# Patient Record
Sex: MALE
[De-identification: names, ages, dates, MRNs, and addresses within clinical notes are randomized per-mention and may not be internally consistent; named-entity substitution may affect disease eponyms.]

---

## 2020-06-26 ENCOUNTER — HOSPITAL ENCOUNTER (INPATIENT)
Dept: HOSPITAL 88 - ER | Age: 80
LOS: 2 days | Discharge: HOME | DRG: 378 | End: 2020-06-28
Attending: INTERNAL MEDICINE | Admitting: INTERNAL MEDICINE
Payer: MEDICARE

## 2020-06-26 VITALS — DIASTOLIC BLOOD PRESSURE: 63 MMHG | SYSTOLIC BLOOD PRESSURE: 116 MMHG

## 2020-06-26 VITALS — SYSTOLIC BLOOD PRESSURE: 108 MMHG | DIASTOLIC BLOOD PRESSURE: 58 MMHG

## 2020-06-26 VITALS — WEIGHT: 112 LBS | HEIGHT: 68 IN | BODY MASS INDEX: 16.97 KG/M2

## 2020-06-26 VITALS — DIASTOLIC BLOOD PRESSURE: 69 MMHG | SYSTOLIC BLOOD PRESSURE: 140 MMHG

## 2020-06-26 VITALS — SYSTOLIC BLOOD PRESSURE: 112 MMHG | DIASTOLIC BLOOD PRESSURE: 56 MMHG

## 2020-06-26 VITALS — DIASTOLIC BLOOD PRESSURE: 61 MMHG | SYSTOLIC BLOOD PRESSURE: 128 MMHG

## 2020-06-26 VITALS — SYSTOLIC BLOOD PRESSURE: 125 MMHG | DIASTOLIC BLOOD PRESSURE: 56 MMHG

## 2020-06-26 DIAGNOSIS — K92.2: Primary | ICD-10-CM

## 2020-06-26 DIAGNOSIS — R13.10: ICD-10-CM

## 2020-06-26 DIAGNOSIS — R63.4: ICD-10-CM

## 2020-06-26 DIAGNOSIS — D50.0: ICD-10-CM

## 2020-06-26 DIAGNOSIS — K20.9: ICD-10-CM

## 2020-06-26 DIAGNOSIS — K31.84: ICD-10-CM

## 2020-06-26 DIAGNOSIS — E11.43: ICD-10-CM

## 2020-06-26 DIAGNOSIS — N13.30: ICD-10-CM

## 2020-06-26 LAB
ALBUMIN SERPL-MCNC: 3.1 G/DL (ref 3.5–5)
ALBUMIN/GLOB SERPL: 1.1 {RATIO} (ref 0.8–2)
ALP SERPL-CCNC: 79 IU/L (ref 40–150)
ALT SERPL-CCNC: 19 IU/L (ref 0–55)
AMYLASE SERPL-CCNC: 39 U/L (ref 25–125)
ANION GAP SERPL CALC-SCNC: 11 MMOL/L (ref 8–16)
BASOPHILS # BLD AUTO: 0 10*3/UL (ref 0–0.1)
BASOPHILS # BLD AUTO: 0.1 10*3/UL (ref 0–0.1)
BASOPHILS NFR BLD AUTO: 0.5 % (ref 0–1)
BASOPHILS NFR BLD AUTO: 0.7 % (ref 0–1)
BUN SERPL-MCNC: 26 MG/DL (ref 7–26)
BUN/CREAT SERPL: 27 (ref 6–25)
CALCIUM SERPL-MCNC: 8.1 MG/DL (ref 8.4–10.2)
CHLORIDE SERPL-SCNC: 103 MMOL/L (ref 98–107)
CK MB SERPL-MCNC: 3.1 NG/ML (ref 0–5)
CK SERPL-CCNC: 130 IU/L (ref 30–200)
CO2 SERPL-SCNC: 24 MMOL/L (ref 22–29)
DEPRECATED APTT PLAS QN: 31.9 SECONDS (ref 23.8–35.5)
DEPRECATED INR PLAS: 0.99
DEPRECATED NEUTROPHILS # BLD AUTO: 3.4 10*3/UL (ref 2.1–6.9)
DEPRECATED NEUTROPHILS # BLD AUTO: 5 10*3/UL (ref 2.1–6.9)
EGFRCR SERPLBLD CKD-EPI 2021: > 60 ML/MIN (ref 60–?)
EOSINOPHIL # BLD AUTO: 0.1 10*3/UL (ref 0–0.4)
EOSINOPHIL # BLD AUTO: 0.2 10*3/UL (ref 0–0.4)
EOSINOPHIL NFR BLD AUTO: 1.4 % (ref 0–6)
EOSINOPHIL NFR BLD AUTO: 3.1 % (ref 0–6)
ERYTHROCYTE [DISTWIDTH] IN CORD BLOOD: 19.3 % (ref 11.7–14.4)
ERYTHROCYTE [DISTWIDTH] IN CORD BLOOD: 19.4 % (ref 11.7–14.4)
GLOBULIN PLAS-MCNC: 2.7 G/DL (ref 2.3–3.5)
GLUCOSE SERPLBLD-MCNC: 116 MG/DL (ref 74–118)
HCT VFR BLD AUTO: 20.2 % (ref 38.2–49.6)
HCT VFR BLD AUTO: 21.1 % (ref 38.2–49.6)
HGB BLD-MCNC: 6 G/DL (ref 14–18)
HGB BLD-MCNC: 6.2 G/DL (ref 14–18)
LIPASE SERPL-CCNC: 57 U/L (ref 8–78)
LYMPHOCYTES # BLD: 1.1 10*3/UL (ref 1–3.2)
LYMPHOCYTES # BLD: 1.3 10*3/UL (ref 1–3.2)
LYMPHOCYTES NFR BLD AUTO: 15.9 % (ref 18–39.1)
LYMPHOCYTES NFR BLD AUTO: 21.2 % (ref 18–39.1)
MCH RBC QN AUTO: 27.8 PG (ref 28–32)
MCH RBC QN AUTO: 27.9 PG (ref 28–32)
MCHC RBC AUTO-ENTMCNC: 29.4 G/DL (ref 31–35)
MCHC RBC AUTO-ENTMCNC: 29.7 G/DL (ref 31–35)
MCV RBC AUTO: 94 FL (ref 81–99)
MCV RBC AUTO: 94.6 FL (ref 81–99)
MONOCYTES # BLD AUTO: 0.8 10*3/UL (ref 0.2–0.8)
MONOCYTES # BLD AUTO: 1 10*3/UL (ref 0.2–0.8)
MONOCYTES NFR BLD AUTO: 11.2 % (ref 4.4–11.3)
MONOCYTES NFR BLD AUTO: 16.8 % (ref 4.4–11.3)
NEUTS SEG NFR BLD AUTO: 58.1 % (ref 38.7–80)
NEUTS SEG NFR BLD AUTO: 70.4 % (ref 38.7–80)
PLATELET # BLD AUTO: 263 X10E3/UL (ref 140–360)
PLATELET # BLD AUTO: 333 X10E3/UL (ref 140–360)
POTASSIUM SERPL-SCNC: 4 MMOL/L (ref 3.5–5.1)
PROTHROMBIN TIME: 13.7 SECONDS (ref 11.9–14.5)
RBC # BLD AUTO: 2.15 X10E6/UL (ref 4.3–5.7)
RBC # BLD AUTO: 2.23 X10E6/UL (ref 4.3–5.7)
SODIUM SERPL-SCNC: 134 MMOL/L (ref 136–145)

## 2020-06-26 PROCEDURE — 85610 PROTHROMBIN TIME: CPT

## 2020-06-26 PROCEDURE — 82270 OCCULT BLOOD FECES: CPT

## 2020-06-26 PROCEDURE — 86850 RBC ANTIBODY SCREEN: CPT

## 2020-06-26 PROCEDURE — 84466 ASSAY OF TRANSFERRIN: CPT

## 2020-06-26 PROCEDURE — 82728 ASSAY OF FERRITIN: CPT

## 2020-06-26 PROCEDURE — 99284 EMERGENCY DEPT VISIT MOD MDM: CPT

## 2020-06-26 PROCEDURE — 88312 SPECIAL STAINS GROUP 1: CPT

## 2020-06-26 PROCEDURE — 71045 X-RAY EXAM CHEST 1 VIEW: CPT

## 2020-06-26 PROCEDURE — 82948 REAGENT STRIP/BLOOD GLUCOSE: CPT

## 2020-06-26 PROCEDURE — 30233N1 TRANSFUSION OF NONAUTOLOGOUS RED BLOOD CELLS INTO PERIPHERAL VEIN, PERCUTANEOUS APPROACH: ICD-10-PCS | Performed by: EMERGENCY MEDICINE

## 2020-06-26 PROCEDURE — 80048 BASIC METABOLIC PNL TOTAL CA: CPT

## 2020-06-26 PROCEDURE — 86900 BLOOD TYPING SEROLOGIC ABO: CPT

## 2020-06-26 PROCEDURE — 83690 ASSAY OF LIPASE: CPT

## 2020-06-26 PROCEDURE — 82553 CREATINE MB FRACTION: CPT

## 2020-06-26 PROCEDURE — 84484 ASSAY OF TROPONIN QUANT: CPT

## 2020-06-26 PROCEDURE — 88342 IMHCHEM/IMCYTCHM 1ST ANTB: CPT

## 2020-06-26 PROCEDURE — 85025 COMPLETE CBC W/AUTO DIFF WBC: CPT

## 2020-06-26 PROCEDURE — 74177 CT ABD & PELVIS W/CONTRAST: CPT

## 2020-06-26 PROCEDURE — 93005 ELECTROCARDIOGRAM TRACING: CPT

## 2020-06-26 PROCEDURE — 82550 ASSAY OF CK (CPK): CPT

## 2020-06-26 PROCEDURE — 85045 AUTOMATED RETICULOCYTE COUNT: CPT

## 2020-06-26 PROCEDURE — 85730 THROMBOPLASTIN TIME PARTIAL: CPT

## 2020-06-26 PROCEDURE — 82746 ASSAY OF FOLIC ACID SERUM: CPT

## 2020-06-26 PROCEDURE — 82150 ASSAY OF AMYLASE: CPT

## 2020-06-26 PROCEDURE — 86920 COMPATIBILITY TEST SPIN: CPT

## 2020-06-26 PROCEDURE — 43450 DILATE ESOPHAGUS 1/MULT PASS: CPT

## 2020-06-26 PROCEDURE — 88305 TISSUE EXAM BY PATHOLOGIST: CPT

## 2020-06-26 PROCEDURE — 83540 ASSAY OF IRON: CPT

## 2020-06-26 PROCEDURE — 82607 VITAMIN B-12: CPT

## 2020-06-26 PROCEDURE — 43239 EGD BIOPSY SINGLE/MULTIPLE: CPT

## 2020-06-26 PROCEDURE — 36415 COLL VENOUS BLD VENIPUNCTURE: CPT

## 2020-06-26 PROCEDURE — 80053 COMPREHEN METABOLIC PANEL: CPT

## 2020-06-26 RX ADMIN — SODIUM CHLORIDE SCH MG: 900 INJECTION INTRAVENOUS at 20:52

## 2020-06-26 RX ADMIN — DEXTROSE AND SODIUM CHLORIDE SCH MLS/HR: 5; 900 INJECTION, SOLUTION INTRAVENOUS at 16:00

## 2020-06-26 RX ADMIN — Medication SCH MLS/HR: at 16:00

## 2020-06-26 NOTE — NUR
Patient seen and evaluated



History of present illness:



80-year-old gentleman who presented to the emergency department as advised by 
his primary care physician for evaluation of left open weight and feeling like 
his food gets stuck in his esophagus.  The patient has not been eating well 
and was noted to have severe anemia hemoglobin was 6.3.  In addition to this 
complaining of intermittent black stools.  Denies hematemesis.  Has been 
complaining of abdominal pain, not significant.  There is no history of chest 
pain, no shortness of breath, no fever no chills.  I went ahead and discussed 
patient condition with Dr. Capo Ryan and the plan is for the patient to 
undergo EGD.

The patient did have CT scan of the abdomen done and the findings were that of 
diffuse distal stomach wall thickening measuring up to 2.5 cm.  No focal mass. 
 Findings could be with an infectious/inflammatory etiology such as gastritis 
or alternatively in the setting of a lymphoproliferative process.



Other findings were that of marketed prostatomegaly.  Distended bladder with 
irregular appearance of the bladder wall without focal mass lesions.  Findings 
were suggestive of chronic bladder outlet obstruction. Mild associated 
bilateral hydronephrosis without extubation [id number] obstructing calculus.



We went ahead and proceeded to order a transfusion of packed red blood cells.



Past medical history:

There is a history of diabetes mellitus type 2, no history myocardial 
infarction.  No CVA.  No previous gastrointestinal problems.



Family history: Noncontributory.



Social history: No tobacco.  No alcohol abuse.



Allergies: No known drug allergies.



Review of system:



Constitutional: No Fever, No chills, No General weakness

HEENT: No headaches.

Cardiovascular: Denies chest pain, palpitations, PND, swelling of the legs.

Respiratory: No Cough, hemoptysis or SOB

GI: Denies Nausea/V/D, hematemesis, melena. 

: Denies Hematuria, Dysuria, Frequency

Musculoskeletal: Denies joint pain

Neuro:  No focal weakness

Psych: No anxiety or depression. 

Skin: No rashes, Itching, Hives



Physical exam: General patient alert oriented person time place.  Severely 
malnourished patient.  Underweight.

Vital signs: Blood pressure 117/59, respiration 16, pulse 86, temperature 98.8





HEENT: No gross abnormalities

Neck: Supple no JVD

Lungs: Clear to auscultation

Heart: Regular rate and rhythm, no murmurs no gallops

Abdomen: Soft non tender, no guarding.

Extremities: No edema

Neurologic: Alert oriented 3, no focal weakness.

Psychiatrist: Normal mood, normal judgment.

Skin: No rashes



Lab data: Hemoglobin 6.2, WBC 7.12, platelet count 333,000.  Sodium 134, 
potassium 4.0, chloride 103, CO2 24, BUN 26, creatinine 0.95



Chest x-rays:

No focal pneumonia or pulmonary edema.





Medications in the ED



Pantoprazole Sodium 80 mg ONCE  STAT IV  Last administered on 6/26/20at 14:45; 
Admin Dose 80 MG;  Start 6/26/20 at 13:59;  Stop 6/26/20 at 14:23;  Status DC

Sodium Chloride 1,000 ml @  0 mls/hr Q0M STAT IV  Last administered on 
6/26/20at 14:45; Admin Dose 999 MLS/HR;  Start 6/26/20 at 13:59;  Stop 6/26/20 
at 14:03;  Status DC

Sodium Chloride 250 ml @ 0 mls/hr ONCE  ONCE IV ;  Start 6/26/20 at 14:00;  
Stop 6/26/20 at 14:03;  Status DC

Furosemide 20 mg ONCE  PRN IV SHORTNESS OF BREATH;  Start 6/26/20 at 14:00;  
Stop 7/26/20 at 13:59

Diatrizoate Meglum/ Diatrizoate Sod 30 ml STK-MED ONCE PO ;  Start 6/26/20 at 
14:22;  Stop 6/26/20 at 14:16;  Status DC

Pantoprazole Sodium 40 mg Q12HR IV ;  Start 6/26/20 at 21:00;  Stop 7/26/20 at 
20:59;  Status UNV

Octreotide Acetate 0.05 mg ONCE  ONCE IV ;  Start 6/26/20 at 15:00;  Stop 
6/26/20 at 15:02;  Status DC

Octreotide Acetate 500 mcg/ Sodium Chloride 2 ml @ 2 mls/hr Q10H IV ;  Start 
6/26/20 at 15:00;  Stop 7/26/20 at 14:59;  Status UNV

Dextrose/Sodium Chloride 1,000 ml @  100 mls/hr Q10H IV ;  Start 6/26/20 at 
15:00;  Stop 6/27/20 at 10:59;  Status UNV



Assessment:

Severe anemia

GI bleeding

Weight loss

Diabetes mellitus type 2



Plan of care:

Request GI consultation

Transfusion of Packed Red blood cells.

Monitor I's and O's.

Glycemic control

## 2020-06-26 NOTE — EMERGENCY DEPARTMENT NOTE
History of Present Illnes


History of Present Illness


Chief Complaint:  Abdominal Complaints


History of Present Illness


This is a 80 year old  male SENT BY DR. SUSHMA HERRING FOR ADMISSION TO 

DR. CAM. CLIENT STATES THAT HE HAS BEEN HAVING WEIGHT LOSS FOR YEARS AND THE 

FEELING OF HAVING FOOD STUCK IN HIS ESOPHAGUS, STATES THAT HE HAS BEEN HAVING 

THE PAIN AND NOT BEING ABLE TO EAT ANY FULL MEAL. HGB WAS AT 6.3 RECENTLY. INTE

RMITTENT BLACK STOOLS


Historian:  Patient


Arrival Mode:  Car


 Required:  No


Onset (how long ago):  year(s)


Radiation:  Reports non-radiation


Severity:  moderate


Onset quality:  gradual


Timing of current episode:  constant


Chronicity:  new


Context:  Denies recent illness


Relieving factors:  none


Exacerbating factors:  none


Associated symptoms:  Reports denies other symptoms


Treatments prior to arrival:  none





Past Medical/Family History


Physician Review


I have reviewed the patient's past medical and family history.  Any updates have

been documented here.





Past Medical History


Recent Fever:  No


Clinical Suspicion of Infectio:  No


New/Unexplained Change in Ment:  No


Past Medical History:  Diabetes





Social History


Smoking Cessation:  Former smoker


Counseling Performed:  No


Alcohol Use:  None


Any Illegal Drug Use:  No


TB Exposure/Symptoms:  No


Physically hurt or threatened:  No





Other


Any Pre-Existing Lines (PICC,:  No


Is patient up to date on immun:  Yes


Last Flu:  utd


Last Pneumovax:  utd





Review of Systems


Review of Systems


Constitutional:  Reports as per HPI, Reports other (WEIGHT LOSS)


EENTM:  Reports no symptoms


Cardiovascular:  Reports no symptoms


Respiratory:  Reports no symptoms


Gastrointestinal:  Reports as per HPI, Reports abdominal pain (INTERMITTENT MARJORIE 

ABD PAIN), Reports other (BLACK STOOLS)


Genitourinary:  Reports no symptoms


Musculoskeletal:  Reports no symptoms


Integumentary:  Reports no symptoms


Neurological:  Reports no symptoms


Psychological:  Reports no symptoms


Endocrine:  Reports no symptoms


Hematological/Lymphatic:  Reports no symptoms





Physical Exam


Related Data


Allergies:  


Coded Allergies:  


     No Known Allergies (Unverified , 6/26/20)


Triage Vital Signs





Vital Signs








  Date Time  Temp Pulse Resp B/P (MAP) Pulse Ox O2 Delivery O2 Flow Rate FiO2


 


6/26/20 13:42 98.8 86 16 117/59 100   








Vital signs reviewed:  Yes





Physical Exam


CONSTITUTIONAL





Constitutional:  Present cachectic, Present ill appearing


HENT


HENT:  Present normocephalic, Present atraumatic, Present oropharynx 

clear/moist, Present nose normal


HENT L/R:  Present left ext ear normal, Present right ext ear normal


EYES





Eyes:  Reports PERRL, Reports conjunctivae normal


NECK


Neck:  Present ROM normal


PULMONARY


Pulmonary:  Present effort normal, Present breath sounds normal


CARDIOVASCULAR





Cardiovascular:  Present regular rhythm, Present heart sounds normal, Present 

capillary refill normal, Present normal rate


GASTROINTESTINAL





Abdominal:  Present soft, Present nontender, Present bowel sounds normal


GENITOURINARY





Genitourinary:  Present exam deferred


SKIN


Skin:  Present warm, Present dry


MUSCULOSKELETAL





Musculoskeletal:  Present ROM normal


NEUROLOGICAL





Neurological:  Present alert, Present oriented x 3, Present no gross motor or 

sensory deficits


PSYCHOLOGICAL


Psychological:  Present mood/affect normal, Present judgement normal





Results


Laboratory


Result Diagram:  


6/26/20 1410                                                                    

           6/26/20 1410





Laboratory





Laboratory Tests








Test


 6/26/20


14:10


 


White Blood Count


 7.12 x10e3/uL


(4.8-10.8)


 


Red Blood Count


 2.23 x10e6/uL


(4.3-5.7)


 


Hemoglobin


 6.2 g/dL


(14.0-18.0)


 


Hematocrit


 21.1 %


(38.2-49.6)


 


Mean Corpuscular Volume


 94.6 fL


(81-99)


 


Mean Corpuscular Hemoglobin


 27.8 pg


(28-32)


 


Mean Corpuscular Hemoglobin


Concent 29.4 g/dL


(31-35)


 


Red Cell Distribution Width


 19.4 %


(11.7-14.4)


 


Platelet Count


 333 x10e3/uL


(140-360)


 


Neutrophils (%) (Auto)


 70.4 %


(38.7-80.0)


 


Lymphocytes (%) (Auto)


 15.9 %


(18.0-39.1)


 


Monocytes (%) (Auto)


 11.2  %


(4.4-11.3)


 


Eosinophils (%) (Auto)


 1.4 %


(0.0-6.0)


 


Basophils (%) (Auto)


 0.7 %


(0.0-1.0)


 


Neutrophils # (Auto) 5.0 (2.1-6.9) 


 


Lymphocytes # (Auto) 1.1 (1.0-3.2) 


 


Monocytes # (Auto) 0.8 (0.2-0.8) 


 


Eosinophils # (Auto) 0.1 (0.0-0.4) 


 


Basophils # (Auto) 0.1 (0.0-0.1) 


 


Absolute Immature Granulocyte


(auto 0.03 x10e3/uL


(0-0.1)


 


Prothrombin Time


 13.7 seconds


(11.9-14.5)


 


Prothromb Time International


Ratio 0.99 





 


Activated Partial


Thromboplast Time 31.9 seconds


(23.8-35.5)


 


Sodium Level


 134 mmol/L


(136-145)


 


Potassium Level


 4.0 mmol/L


(3.5-5.1)


 


Chloride Level


 103 mmol/L


()


 


Carbon Dioxide Level


 24 mmol/L


(22-29)


 


Anion Gap


 11.0 mmol/L


(8-16)


 


Blood Urea Nitrogen


 26 mg/dL


(7-26)


 


Creatinine


 0.95 mg/dL


(0.72-1.25)


 


Estimat Glomerular Filtration


Rate > 60 ML/MIN


(60-)


 


BUN/Creatinine Ratio 27 (6-25) 


 


Glucose Level


 116 mg/dL


()


 


Calcium Level


 8.1 mg/dL


(8.4-10.2)


 


Total Bilirubin


 0.3 mg/dL


(0.2-1.2)


 


Aspartate Amino Transf


(AST/SGOT) 24 IU/L (5-34) 





 


Alanine Aminotransferase


(ALT/SGPT) 19 IU/L (0-55) 





 


Alkaline Phosphatase


 79 IU/L


()


 


Creatine Kinase


 130 IU/L


()


 


Creatine Kinase MB


 3.10 ng/mL


(0-5.0)


 


Troponin I


 0.007 ng/mL


(0-0.300)


 


Total Protein


 5.8 g/dL


(6.5-8.1)


 


Albumin


 3.1 g/dL


(3.5-5.0)


 


Globulin


 2.7 g/dL


(2.3-3.5)


 


Albumin/Globulin Ratio 1.1 (0.8-2.0) 


 


Amylase Level


 39 U/L


()


 


Lipase 57 U/L (8-78) 








Lab results reviewed:  Yes





Imaging


Imaging results reviewed:  Yes


Impressions


EXAMINATION:  CHEST SINGLE (PORTABLE)    





INDICATION: Weakness





COMPARISON: None


     


FINDINGS:





LINES/TUBES:EKG leads overlie the chest.





LUNGS:The lungs are well-inflated. No focal consolidation or pulmonary edema.





PLEURA:No pleural effusion or pneumothorax.





MEDIASTINUM:The cardiomediastinal silhouette appears normal in size and shape.


Atherosclerotic calcifications of the thoracic aorta.





BONES/SOFT TISSUES:No acute osseous injury.





ABDOMEN:No free air under the diaphragm.








IMPRESSION: 


No focal pneumonia or pulmonary edema.





Signed by: Francisco Gonzalez MD on 6/26/2020 2:34 PM





Assessment & Plan


Medical Decision Making


MDM


SENT FOR ANEMIA & WT LOSS - CHECK CBC, CHEM'S, PT/PTT, VASYL/LIPASE, CT ABD/PELVIS

- EVAL FOR GI BLOOD LOSS, SEVERE ANEMIA REQUIRING BLOOD TRANSFUSION, 

PANCREATITIS, RENAL INSUFF





Reassessment


Reassessment


D/W DR AKERS AND DR KETURAH LAWSON





Assessment & Plan


Final Impression:  


(1) Weight loss


(2) GI bleed


Depart Disposition:  ADMITTED


Last Vital Signs











  Date Time  Temp Pulse Resp B/P (MAP) Pulse Ox O2 Delivery O2 Flow Rate FiO2


 


6/26/20 14:24 98.1 71 13 121/59 100   








Medications in the ED





Pantoprazole Sodium 80 mg ONCE  STAT IV  Last administered on 6/26/20at 14:45; 

Admin Dose 80 MG;  Start 6/26/20 at 13:59;  Stop 6/26/20 at 14:23;  Status DC


Sodium Chloride 1,000 ml @  0 mls/hr Q0M STAT IV  Last administered on 6/26/20at

14:45; Admin Dose 999 MLS/HR;  Start 6/26/20 at 13:59;  Stop 6/26/20 at 14:03;  

Status DC


Sodium Chloride 250 ml @ 0 mls/hr ONCE  ONCE IV ;  Start 6/26/20 at 14:00;  Stop

6/26/20 at 14:03;  Status DC


Furosemide 20 mg ONCE  PRN IV SHORTNESS OF BREATH;  Start 6/26/20 at 14:00;  

Stop 7/26/20 at 13:59


Diatrizoate Meglum/ Diatrizoate Sod 30 ml STK-MED ONCE PO ;  Start 6/26/20 at 

14:22;  Stop 6/26/20 at 14:16;  Status DC


Pantoprazole Sodium 40 mg Q12HR IV ;  Start 6/26/20 at 21:00;  Stop 7/26/20 at 

20:59;  Status UNV


Octreotide Acetate 0.05 mg ONCE  ONCE IV ;  Start 6/26/20 at 15:00;  Stop 

6/26/20 at 15:02;  Status DC


Octreotide Acetate 500 mcg/ Sodium Chloride 2 ml @ 2 mls/hr Q10H IV ;  Start 

6/26/20 at 15:00;  Stop 7/26/20 at 14:59;  Status UNV


Dextrose/Sodium Chloride 1,000 ml @  100 mls/hr Q10H IV ;  Start 6/26/20 at 

15:00;  Stop 6/27/20 at 10:59;  Status UNV











YASIR POPE MD               Jun 26, 2020 15:26

## 2020-06-26 NOTE — DIAGNOSTIC IMAGING REPORT
EXAM: CT Abdomen and Pelvis WITH intravenous contrast  



INDICATION: Abdominal pain



COMPARISON: Chest radiograph of earlier the same day



TECHNIQUE: Abdomen and pelvis were scanned utilizing a multidetector helical

scanner from the lung base to the pubic symphysis after administration of IV

contrast. Coronal and sagittal reformations were obtained. Routine protocol was

performed. Scan was performed during portal venous phase.

     

IV CONTRAST: 100mL of Isovue 370

ORAL CONTRAST: Gastrografin



RADIATION DOSE:

Total DLP:  172 mGy*cm



Dose modulation, iterative reconstruction, and/or weight based adjustment of

the mA/kV was utilized to reduce the radiation dose to as low as reasonably

achievable. 



FINDINGS:

LOWER THORAX: Partially visualized mild bronchial wall thickening and distal

bronchial mucus plugging in the right middle lobe and lingula, possibly related

to aspiration.



HEPATOBILIARY: No focal liver lesion. No biliary ductal dilation. Mild hepatic

steatosis. Unremarkable gallbladder.



SPLEEN: No splenomegaly.



PANCREAS: No focal masses or ductal dilatation.



ADRENALS: No adrenal nodules.

KIDNEYS/URETERS: Mild bilateral hydronephrosis. No definite obstructing

calculus. No solid renal mass lesion. 2.7 cm left upper pole renal cyst.

PELVIC ORGANS/BLADDER: Bladder distention and irregular appearance of the

bladder wall without focal mass lesion. The prostate is enlarged, measuring up

to 5.9 x 5.1 x 5.4 cm (volume estimate 85 cc).



PERITONEUM / RETROPERITONEUM: No free air or fluid.

LYMPH NODES: No lymphadenopathy.

VESSELS: Scattered atherosclerotic calcifications of the nonaneurysmal

abdominal aorta and major branches.



GI TRACT: Distended stomach filled with contrast. Diffuse wall thickening and

edematous appearance of the distal antrum and pyloric region where the wall

measures up to 2.5 cm. No focal mass.



BONES AND SOFT TISSUES: No acute osseous injury. Degenerative changes of the

visualized spine. No suspicious lytic or blastic lesions.



IMPRESSION: 

Diffuse distal stomach wall thickening measuring up to 2.5 cm. No focal mass.

Findings can be seen with an infectious/inflammatory etiology such as gastritis

or alternatively in the setting of a lymphoproliferative process.



Marked prostatomegaly. Distended bladder with irregular appearance of the

bladder wall without focal mass lesion. Findings are suggestive of chronic

bladder outlet obstruction. Mild associated bilateral hydronephrosis without

obstructing calculus.



Signed by: Francisco Gonzalez MD on 6/26/2020 4:28 PM

## 2020-06-26 NOTE — DIAGNOSTIC IMAGING REPORT
EXAMINATION:  CHEST SINGLE (PORTABLE)    



INDICATION: Weakness



COMPARISON: None

     

FINDINGS:



LINES/TUBES:EKG leads overlie the chest.



LUNGS:The lungs are well-inflated. No focal consolidation or pulmonary edema.



PLEURA:No pleural effusion or pneumothorax.



MEDIASTINUM:The cardiomediastinal silhouette appears normal in size and shape.

Atherosclerotic calcifications of the thoracic aorta.



BONES/SOFT TISSUES:No acute osseous injury.



ABDOMEN:No free air under the diaphragm.





IMPRESSION: 

No focal pneumonia or pulmonary edema.



Signed by: Francisco Gonzalez MD on 6/26/2020 2:34 PM

## 2020-06-27 VITALS — SYSTOLIC BLOOD PRESSURE: 119 MMHG | DIASTOLIC BLOOD PRESSURE: 69 MMHG

## 2020-06-27 VITALS — SYSTOLIC BLOOD PRESSURE: 124 MMHG | DIASTOLIC BLOOD PRESSURE: 65 MMHG

## 2020-06-27 VITALS — DIASTOLIC BLOOD PRESSURE: 71 MMHG | SYSTOLIC BLOOD PRESSURE: 137 MMHG

## 2020-06-27 VITALS — SYSTOLIC BLOOD PRESSURE: 133 MMHG | DIASTOLIC BLOOD PRESSURE: 71 MMHG

## 2020-06-27 VITALS — DIASTOLIC BLOOD PRESSURE: 87 MMHG | SYSTOLIC BLOOD PRESSURE: 174 MMHG

## 2020-06-27 VITALS — SYSTOLIC BLOOD PRESSURE: 178 MMHG | DIASTOLIC BLOOD PRESSURE: 77 MMHG

## 2020-06-27 VITALS — SYSTOLIC BLOOD PRESSURE: 98 MMHG | DIASTOLIC BLOOD PRESSURE: 59 MMHG

## 2020-06-27 VITALS — SYSTOLIC BLOOD PRESSURE: 101 MMHG | DIASTOLIC BLOOD PRESSURE: 62 MMHG

## 2020-06-27 VITALS — SYSTOLIC BLOOD PRESSURE: 120 MMHG | DIASTOLIC BLOOD PRESSURE: 61 MMHG

## 2020-06-27 VITALS — SYSTOLIC BLOOD PRESSURE: 128 MMHG | DIASTOLIC BLOOD PRESSURE: 70 MMHG

## 2020-06-27 VITALS — DIASTOLIC BLOOD PRESSURE: 68 MMHG | SYSTOLIC BLOOD PRESSURE: 134 MMHG

## 2020-06-27 VITALS — DIASTOLIC BLOOD PRESSURE: 65 MMHG | SYSTOLIC BLOOD PRESSURE: 130 MMHG

## 2020-06-27 VITALS — DIASTOLIC BLOOD PRESSURE: 61 MMHG | SYSTOLIC BLOOD PRESSURE: 120 MMHG

## 2020-06-27 VITALS — DIASTOLIC BLOOD PRESSURE: 77 MMHG | SYSTOLIC BLOOD PRESSURE: 178 MMHG

## 2020-06-27 VITALS — SYSTOLIC BLOOD PRESSURE: 134 MMHG | DIASTOLIC BLOOD PRESSURE: 66 MMHG

## 2020-06-27 VITALS — DIASTOLIC BLOOD PRESSURE: 59 MMHG | SYSTOLIC BLOOD PRESSURE: 98 MMHG

## 2020-06-27 LAB
ANION GAP SERPL CALC-SCNC: 10.1 MMOL/L (ref 8–16)
BASOPHILS # BLD AUTO: 0 10*3/UL (ref 0–0.1)
BASOPHILS # BLD AUTO: 0.1 10*3/UL (ref 0–0.1)
BASOPHILS NFR BLD AUTO: 0.5 % (ref 0–1)
BASOPHILS NFR BLD AUTO: 0.5 % (ref 0–1)
BASOPHILS NFR BLD AUTO: 0.7 % (ref 0–1)
BASOPHILS NFR BLD AUTO: 1 % (ref 0–1)
BUN SERPL-MCNC: 16 MG/DL (ref 7–26)
BUN/CREAT SERPL: 19 (ref 6–25)
CALCIUM SERPL-MCNC: 7.9 MG/DL (ref 8.4–10.2)
CHLORIDE SERPL-SCNC: 107 MMOL/L (ref 98–107)
CO2 SERPL-SCNC: 24 MMOL/L (ref 22–29)
DEPRECATED NEUTROPHILS # BLD AUTO: 3.7 10*3/UL (ref 2.1–6.9)
DEPRECATED NEUTROPHILS # BLD AUTO: 3.9 10*3/UL (ref 2.1–6.9)
DEPRECATED NEUTROPHILS # BLD AUTO: 5.7 10*3/UL (ref 2.1–6.9)
DEPRECATED NEUTROPHILS # BLD AUTO: 6 10*3/UL (ref 2.1–6.9)
EGFRCR SERPLBLD CKD-EPI 2021: > 60 ML/MIN (ref 60–?)
EOSINOPHIL # BLD AUTO: 0.1 10*3/UL (ref 0–0.4)
EOSINOPHIL # BLD AUTO: 0.1 10*3/UL (ref 0–0.4)
EOSINOPHIL # BLD AUTO: 0.2 10*3/UL (ref 0–0.4)
EOSINOPHIL # BLD AUTO: 0.2 10*3/UL (ref 0–0.4)
EOSINOPHIL NFR BLD AUTO: 1.2 % (ref 0–6)
EOSINOPHIL NFR BLD AUTO: 1.5 % (ref 0–6)
EOSINOPHIL NFR BLD AUTO: 2.9 % (ref 0–6)
EOSINOPHIL NFR BLD AUTO: 3.4 % (ref 0–6)
ERYTHROCYTE [DISTWIDTH] IN CORD BLOOD: 18 % (ref 11.7–14.4)
ERYTHROCYTE [DISTWIDTH] IN CORD BLOOD: 18.4 % (ref 11.7–14.4)
ERYTHROCYTE [DISTWIDTH] IN CORD BLOOD: 18.7 % (ref 11.7–14.4)
ERYTHROCYTE [DISTWIDTH] IN CORD BLOOD: 18.9 % (ref 11.7–14.4)
FERRITIN SERPL-MCNC: 17.98 NG/ML (ref 21.81–274.66)
GLUCOSE SERPLBLD-MCNC: 210 MG/DL (ref 74–118)
HCT VFR BLD AUTO: 25.4 % (ref 38.2–49.6)
HCT VFR BLD AUTO: 30.1 % (ref 38.2–49.6)
HCT VFR BLD AUTO: 31.8 % (ref 38.2–49.6)
HCT VFR BLD AUTO: 32 % (ref 38.2–49.6)
HGB BLD-MCNC: 10 G/DL (ref 14–18)
HGB BLD-MCNC: 10 G/DL (ref 14–18)
HGB BLD-MCNC: 7.7 G/DL (ref 14–18)
HGB BLD-MCNC: 9.4 G/DL (ref 14–18)
IRON SATN MFR SERPL: 22 % (ref 15–50)
IRON SERPL-MCNC: 85 UG/DL (ref 65–175)
LYMPHOCYTES # BLD: 0.9 10*3/UL (ref 1–3.2)
LYMPHOCYTES # BLD: 1 10*3/UL (ref 1–3.2)
LYMPHOCYTES # BLD: 1.1 10*3/UL (ref 1–3.2)
LYMPHOCYTES # BLD: 1.2 10*3/UL (ref 1–3.2)
LYMPHOCYTES NFR BLD AUTO: 11.4 % (ref 18–39.1)
LYMPHOCYTES NFR BLD AUTO: 14.3 % (ref 18–39.1)
LYMPHOCYTES NFR BLD AUTO: 17.1 % (ref 18–39.1)
LYMPHOCYTES NFR BLD AUTO: 17.5 % (ref 18–39.1)
MCH RBC QN AUTO: 27.8 PG (ref 28–32)
MCH RBC QN AUTO: 28.3 PG (ref 28–32)
MCH RBC QN AUTO: 28.4 PG (ref 28–32)
MCH RBC QN AUTO: 28.4 PG (ref 28–32)
MCHC RBC AUTO-ENTMCNC: 30.3 G/DL (ref 31–35)
MCHC RBC AUTO-ENTMCNC: 31.2 G/DL (ref 31–35)
MCHC RBC AUTO-ENTMCNC: 31.3 G/DL (ref 31–35)
MCHC RBC AUTO-ENTMCNC: 31.4 G/DL (ref 31–35)
MCV RBC AUTO: 90.3 FL (ref 81–99)
MCV RBC AUTO: 90.7 FL (ref 81–99)
MCV RBC AUTO: 90.9 FL (ref 81–99)
MCV RBC AUTO: 91.7 FL (ref 81–99)
MONOCYTES # BLD AUTO: 0.9 10*3/UL (ref 0.2–0.8)
MONOCYTES # BLD AUTO: 0.9 10*3/UL (ref 0.2–0.8)
MONOCYTES # BLD AUTO: 1 10*3/UL (ref 0.2–0.8)
MONOCYTES # BLD AUTO: 1.2 10*3/UL (ref 0.2–0.8)
MONOCYTES NFR BLD AUTO: 12.8 % (ref 4.4–11.3)
MONOCYTES NFR BLD AUTO: 14 % (ref 4.4–11.3)
MONOCYTES NFR BLD AUTO: 14.8 % (ref 4.4–11.3)
MONOCYTES NFR BLD AUTO: 15 % (ref 4.4–11.3)
NEUTS SEG NFR BLD AUTO: 63.3 % (ref 38.7–80)
NEUTS SEG NFR BLD AUTO: 63.7 % (ref 38.7–80)
NEUTS SEG NFR BLD AUTO: 69.5 % (ref 38.7–80)
NEUTS SEG NFR BLD AUTO: 73.7 % (ref 38.7–80)
PLATELET # BLD AUTO: 256 X10E3/UL (ref 140–360)
PLATELET # BLD AUTO: 260 X10E3/UL (ref 140–360)
PLATELET # BLD AUTO: 275 X10E3/UL (ref 140–360)
PLATELET # BLD AUTO: 295 X10E3/UL (ref 140–360)
POTASSIUM SERPL-SCNC: 4.1 MMOL/L (ref 3.5–5.1)
RBC # BLD AUTO: 2.77 X10E6/UL (ref 4.3–5.7)
RBC # BLD AUTO: 3.31 X10E6/UL (ref 4.3–5.7)
RBC # BLD AUTO: 3.52 X10E6/UL (ref 4.3–5.7)
RBC # BLD AUTO: 3.53 X10E6/UL (ref 4.3–5.7)
SODIUM SERPL-SCNC: 137 MMOL/L (ref 136–145)
TIBC SERPL-MCNC: 388 UG/DL (ref 261–478)
TRANSFERRIN SERPL-MCNC: 277 MG/DL (ref 174–364)

## 2020-06-27 PROCEDURE — 0DB78ZX EXCISION OF STOMACH, PYLORUS, VIA NATURAL OR ARTIFICIAL OPENING ENDOSCOPIC, DIAGNOSTIC: ICD-10-PCS | Performed by: INTERNAL MEDICINE

## 2020-06-27 PROCEDURE — 0D758ZZ DILATION OF ESOPHAGUS, VIA NATURAL OR ARTIFICIAL OPENING ENDOSCOPIC: ICD-10-PCS | Performed by: INTERNAL MEDICINE

## 2020-06-27 RX ADMIN — SODIUM CHLORIDE SCH MG: 900 INJECTION INTRAVENOUS at 21:29

## 2020-06-27 RX ADMIN — SUCRALFATE SCH G: 1 SUSPENSION ORAL at 16:40

## 2020-06-27 RX ADMIN — SUCRALFATE SCH G: 1 SUSPENSION ORAL at 21:13

## 2020-06-27 RX ADMIN — SODIUM CHLORIDE SCH MG: 900 INJECTION INTRAVENOUS at 09:09

## 2020-06-27 RX ADMIN — INSULIN LISPRO SCH UNIT: 100 INJECTION, SOLUTION INTRAVENOUS; SUBCUTANEOUS at 16:30

## 2020-06-27 RX ADMIN — SUCRALFATE SCH G: 1 SUSPENSION ORAL at 11:55

## 2020-06-27 RX ADMIN — DEXTROSE AND SODIUM CHLORIDE SCH MLS/HR: 5; 900 INJECTION, SOLUTION INTRAVENOUS at 01:39

## 2020-06-27 RX ADMIN — INSULIN LISPRO SCH UNIT: 100 INJECTION, SOLUTION INTRAVENOUS; SUBCUTANEOUS at 21:00

## 2020-06-27 RX ADMIN — INSULIN LISPRO SCH UNIT: 100 INJECTION, SOLUTION INTRAVENOUS; SUBCUTANEOUS at 11:30

## 2020-06-27 RX ADMIN — Medication SCH MLS/HR: at 12:00

## 2020-06-27 RX ADMIN — INSULIN LISPRO SCH UNIT: 100 INJECTION, SOLUTION INTRAVENOUS; SUBCUTANEOUS at 08:40

## 2020-06-27 NOTE — NUR
Nutrition Intervention Note



RD Recommendation(s) for Physician: 

-Continue current diet as ordered

-Glucerna with meals for added nutrition to promote weight gain

The patient meets criteria for unspecified SEVERE protein-calorie malnutrition. 



Plan of Care: RD following, monitoring for tolerance and adequacy, oral supplement 
recommendation



Nutrition reason for involvement: Nutrition Risk Trigger



RD Assessment

(6/27/20) Pt is an 80 year old male admitted with GI bleed. Pt had an EGD with dilation and 
biopsy this morning. Pt is currently on a GI soft diet. Prior to admission, pt reports 
eating <50% of his meals for the past month. Pt also stated he had lost weight and used to 
weigh 125 lbs in at the beginning of April. Pt currently has a weight of 112 lbs in chart. 
This would be a 10% weight loss in ~ 3  months, which is considered to be significant weight 
loss. No N/V or chewing/swallowing issues at this time. Recommend a nutrition supplement be 
provided with meals for added nutrition to promote weight gain. Will continue to monitor.



Principal Problems/Diagnoses: GI bleed



PMH: type 2 diabetes mellitus



GI:  soft, non-tender abdomen



Skin: intact



Labs: (6/27/20)  Glu 210, Ca 7.9 



Meds: octeotride, carafate, insulin, protonix, lasix PRN



Ht: 68 inches

Wt: 112 lbs

BMI: 17.0 kg/m2 (underweight)

IBW: 140 lbs 



Malnutrition Evaluation (6/27/20)

The patient meets criteria for unspecified SEVERE protein-calorie malnutrition. 



Energy intake:

<75% of estimated energy requirements for 1 month 



Weight loss:

>7.5% in 3 months (Acute)



Fat loss: unable to evaluate

Muscle loss: unable to evaluate



Supporting Evidence:

Fluid accumulation:  unable to evaluate

Functional Status: unable to evaluate



Nutrition Prescription (Diet Order): GI soft



Estimated Nutritional Needs:

0147-6397 calories/day  (30-35 calories/kg CBW)

 g protein/day (1.5-2 g pro/kg CBW)



Diet Adequacy:

Not meeting calorie needs, Not meeting protein needs



Tolerance: Tolerating PO





Diet Education Needs Assessment: RD is available for diet education as needed



Nutrition Care Level: moderate



Nutrition Diagnosis: Severe malnutrition related to h/o inadequate intake as evidenced by pt 
meeting <75% of estimated energy needs for  1 month and >7.5% weight loss in 3 months.





Goal: Patient will meet % of estimated needs by follow up 



Progress: N/A



Interventions:

-fiber-modified diet, Commercial beverage



Monitoring/Evaluation:

-Total energy intake, Total protein intake, Modified diet, Liquid supplement, Weight change





Signed: Ce Ba RD, LD

## 2020-06-27 NOTE — OPERATIVE REPORT
DATE OF PROCEDURE:  06/27/2020

 

SURGEON:  Capo Luque MD

 

PROCEDURE:  EGD with esophageal dilatation and biopsies.

 

INDICATIONS FOR PROCEDURE:  Dysphagia, anemia, weight loss.

 

MEDICATIONS:  The patient was done under MAC, please see anesthesiologist's note.

 

PROCEDURE IN DETAIL:  With the patient in left lateral decubitus position, a flexible

fiberoptic Olympus gastroscope was introduced into the esophagus under direct

visualization without any difficulty.  Mucosa overlying the distal esophagus revealed

diffuse erythema.  The esophagus was dilated to size 52-[place].  The scope was

then advanced with ease into the stomach.  There were some moderate amount of retained

solid and liquid debris in the stomach precluding visualization of the fundus and the

upper body of the stomach.  The mucosa overlying the antrum was friable, nodular, and

the tissue felt somewhat indurated.  Multiple biopsies were obtained.  The scope was

then advanced all the way to the second portion of the duodenum.  The scope was then

withdrawn slowly.  Mucosa overlying the proximal second portion and the duodenal bulb

appeared to be within normal limits.  The scope was then withdrawn back into the stomach

and retroflexed, and the fundus, the cardia, and the proximal body of the stomach could

not be visualized due to the retained aforementioned food stuff.  The scope was then

straightened out, it was subsequently withdrawn.  The patient tolerated the procedure

well. 

 

IMPRESSION:  

1. Distal esophagitis.

2. Esophagus dilated to size 52-[place].

3. Antrum, nodular, firm, and friable, multiple biopsies obtained.

 

PLAN:  Follow up histology.  Continue PPI therapy.

 

 

 

 

______________________________

Capo Luque MD

 

[place]

D:  06/27/2020 07:57:47

T:  06/27/2020 08:12:22

Job #:  [id number]

 

cc:            Devin Vale MD 

               Fax: [phone] 

 

               Gordon Monson MD

## 2020-06-27 NOTE — NUR
RECEIVED PT IN BED AOX3 DENIES PAIN RT AC 18 G AND LEFT AC 20G   .RESPIRATIONS ARE EVEN AND 
UNLABORED .CALL LIGHT WITH IN REACH .CONTINUE TO MONITOR

## 2020-06-27 NOTE — NUR
REPORT GIVEN TO DAYSHIFT NURSE. PATIENT IN STABLE CONDITION. NO SIGNS OF IV INFILTRATION. 
BED LOCKED AND IN LOW POSITION. CALL LIGHT WITHIN REACH.

## 2020-06-27 NOTE — NUR
PATIENT BACK TO UNIT FROM ENDO. REPORT RECEIVED FROM MISSY [place]. PT HAD AN EGD WITH DILATION 
AND BIOPSY; FINDINGS ARE MILD AMOUNT OF RETAINED FOOD IN THE STOMACH.  DENIED PAIN AT THIS 
TIME. V/S 97.4-65-/61 % ON RA. KITCHEN CALLED FOR BREAKFAST TRAY.

## 2020-06-27 NOTE — PROGRESS NOTE
DATE:  06/27/2020  

 

SUBJECTIVE:  The patient is doing fine.  At the time of evaluation, the patient stated

that he is eating better, swallowing is concerned.  He has been complaining of cramps on

the lower extremities.  He denies nausea.  No vomiting.  No diarrhea at this time.  No

chest pain.  No shortness of breath.  No fever.  No chills.  No urinary symptoms. 

 

OBJECTIVE:  GENERAL:  The patient has been alert and oriented to person, time, and

place.  The patient is in no distress. 

VITAL SIGNS:  Blood pressure 98/59, respirations 18, pulse 64, and temperature 98.0. 

HEENT:  Normocephalic and atraumatic. 

NECK:  Supple.  No JVD. 

LUNGS:  Clear to auscultation. 

HEART:  Regular rate and rhythm. 

ABDOMEN:  Soft. 

EXTREMITIES:  No edema. 

NEURO:  No focal.  The patient does not look well nourished.

 

LABORATORY DATA:  CBC revealed hemoglobin 9.4, white blood cell count 7.75, and platelet

count 256,000. 

 

Chem profile revealed sodium 137, potassium 4.1, chloride 107, CO2 24, BUN 16,

creatinine 0.75, blood sugar 210, and calcium level 7.9. 

 

ASSESSMENT:  

1. Anemia secondary to gastrointestinal bleeding.

2. History of loss of weight.

3. Diabetes mellitus type 2.

 

PLAN OF CARE:  Continue to monitor H and H.  Advance diet as tolerated.  Glycemic

control.  The patient is seen, might have gastric cancer and will need followup with

oncologist and General Surgery. 

 

 

 

 

______________________________

MD SAMUEL Mccallum/NORMA

D:  06/27/2020 21:01:09

T:  06/27/2020 21:47:29

Job #:  [id number]

## 2020-06-28 VITALS — DIASTOLIC BLOOD PRESSURE: 67 MMHG | SYSTOLIC BLOOD PRESSURE: 120 MMHG

## 2020-06-28 VITALS — SYSTOLIC BLOOD PRESSURE: 121 MMHG | DIASTOLIC BLOOD PRESSURE: 79 MMHG

## 2020-06-28 VITALS — SYSTOLIC BLOOD PRESSURE: 110 MMHG | DIASTOLIC BLOOD PRESSURE: 69 MMHG

## 2020-06-28 VITALS — DIASTOLIC BLOOD PRESSURE: 70 MMHG | SYSTOLIC BLOOD PRESSURE: 113 MMHG

## 2020-06-28 VITALS — DIASTOLIC BLOOD PRESSURE: 63 MMHG | SYSTOLIC BLOOD PRESSURE: 103 MMHG

## 2020-06-28 LAB
BASOPHILS # BLD AUTO: 0 10*3/UL (ref 0–0.1)
BASOPHILS NFR BLD AUTO: 0.5 % (ref 0–1)
DEPRECATED NEUTROPHILS # BLD AUTO: 5.1 10*3/UL (ref 2.1–6.9)
EOSINOPHIL # BLD AUTO: 0.2 10*3/UL (ref 0–0.4)
EOSINOPHIL NFR BLD AUTO: 2.3 % (ref 0–6)
ERYTHROCYTE [DISTWIDTH] IN CORD BLOOD: 18.7 % (ref 11.7–14.4)
HCT VFR BLD AUTO: 31.1 % (ref 38.2–49.6)
HGB BLD-MCNC: 9.6 G/DL (ref 14–18)
LYMPHOCYTES # BLD: 1.1 10*3/UL (ref 1–3.2)
LYMPHOCYTES NFR BLD AUTO: 14.2 % (ref 18–39.1)
MCH RBC QN AUTO: 28.5 PG (ref 28–32)
MCHC RBC AUTO-ENTMCNC: 30.9 G/DL (ref 31–35)
MCV RBC AUTO: 92.3 FL (ref 81–99)
MONOCYTES # BLD AUTO: 1 10*3/UL (ref 0.2–0.8)
MONOCYTES NFR BLD AUTO: 14.1 % (ref 4.4–11.3)
NEUTS SEG NFR BLD AUTO: 68.6 % (ref 38.7–80)
PLATELET # BLD AUTO: 254 X10E3/UL (ref 140–360)
RBC # BLD AUTO: 3.37 X10E6/UL (ref 4.3–5.7)

## 2020-06-28 RX ADMIN — INSULIN LISPRO SCH UNIT: 100 INJECTION, SOLUTION INTRAVENOUS; SUBCUTANEOUS at 16:30

## 2020-06-28 RX ADMIN — SUCRALFATE SCH G: 1 SUSPENSION ORAL at 11:30

## 2020-06-28 RX ADMIN — SODIUM CHLORIDE SCH MG: 900 INJECTION INTRAVENOUS at 09:14

## 2020-06-28 RX ADMIN — INSULIN LISPRO SCH UNIT: 100 INJECTION, SOLUTION INTRAVENOUS; SUBCUTANEOUS at 07:30

## 2020-06-28 RX ADMIN — SUCRALFATE SCH G: 1 SUSPENSION ORAL at 07:30

## 2020-06-28 RX ADMIN — SUCRALFATE SCH G: 1 SUSPENSION ORAL at 16:30

## 2020-06-28 RX ADMIN — INSULIN LISPRO SCH UNIT: 100 INJECTION, SOLUTION INTRAVENOUS; SUBCUTANEOUS at 11:30

## 2020-06-28 NOTE — NUR
PATIENT IN BED WITH HEAD OF BED ELEVATED TALKING ON THE PHONE, NO DISTRESS NOTED. ALL 
PERSONAL ITEMS CLOSE TO PATIENT, CALL LIGHT AT REACH.

## 2020-06-28 NOTE — NUR
PATIENT DISCHARGED HOME. DISCHARGE INSTRUCTIONS, PRESCRIPTIONS, AND FOLLOW UP GIVEN TO 
PATIENT AND DAUGHTER, THEY VERBALIZED UNDERSTANDING. IV TO LEFT AC REMOVED WITH TIP INTACT. 
ALL PERSONAL ITEMS TAKEN WITH PATIENT. LEFT UNIT PER WHEEL CHAIR TO FRONT LOBBY IN STABLE 
CONDITION.

## 2020-06-28 NOTE — DISCHARGE SUMMARY
PRIMARY CARE DOCTOR:  Dr. Gordon Monson. 

 

This is coverage for Dr. Vale.

 

PRIMARY DIAGNOSES:  Include severe anemia, multifactorial.  Including GI blood loss.

 

SECONDARY DIAGNOSES:  Include:

1. Distal esophagitis.

2. Solid dysphagia significant.

3. Postoperative state, status post esophageal dilatation.

4. Retained gastric contents during endoscopy, possible functional gastroparesis or

other issue. 

5. Diabetes.

 

HOSPITAL COURSE:  The patient was having some weight loss and feels like food gets stuck

in his esophagus.  He has not been eating well.  Hemoglobin was noted at 6.3.

Intermittent black stools.  He was sent to emergency room.  The patient had CT abdomen

demonstrating diffuse distal stomach wall thickening measuring up to 2.5 cm.  No focal

mass noted.  The patient underwent endoscopy with findings as above.  Additional CAT

scan finding had shown marked prostatomegaly and distended bladder with a regular

appearance of bladder wall without focal mass lesions.  There was mild associated

bilateral hydronephrosis.  The patient on following day resumed and showed that he was

eating very well.  He was allowed for further outpatient followup in care.  So, his

hemoglobin was 6.0 coming in the hospital, but upon discharge was 9.6.  Platelets are

275.  Creatinine was 0.95.  LFTs were unremarkable mostly, albumin 3.1.  Coagulation

times are normal.  Stool occult blood was positive. 

 

FOLLOWUP TEST:  Multiple gastric biopsies were taken and need followup. 

 

Empiric prostate therapy was started and the patient was added on PPI therapy during his

hospitalization. 

 

FOLLOWUP:  Capo Johnson.

 

MEDICATION ON DISCHARGE:  Please see discharge medication record for details.

 

DIET:  GI bland diet, soft until he is more comfortable. 

 

Greater than 30 minutes in direct care and coordination on this date for discharge.

 

 

 

 

______________________________

MD STEPHIE More/MODL

D:  06/28/2020 09:56:28

T:  06/28/2020 10:32:33

Job #:  [id number]

## 2020-07-08 ENCOUNTER — HOSPITAL ENCOUNTER (INPATIENT)
Dept: HOSPITAL 88 - ER | Age: 80
LOS: 11 days | DRG: 326 | End: 2020-07-19
Attending: INTERNAL MEDICINE | Admitting: INTERNAL MEDICINE
Payer: COMMERCIAL

## 2020-07-08 VITALS — WEIGHT: 106 LBS | HEIGHT: 62 IN | BODY MASS INDEX: 19.51 KG/M2

## 2020-07-08 DIAGNOSIS — R63.0: ICD-10-CM

## 2020-07-08 DIAGNOSIS — D62: ICD-10-CM

## 2020-07-08 DIAGNOSIS — A41.9: ICD-10-CM

## 2020-07-08 DIAGNOSIS — N40.0: ICD-10-CM

## 2020-07-08 DIAGNOSIS — C77.2: ICD-10-CM

## 2020-07-08 DIAGNOSIS — E87.2: ICD-10-CM

## 2020-07-08 DIAGNOSIS — J69.0: ICD-10-CM

## 2020-07-08 DIAGNOSIS — R13.10: ICD-10-CM

## 2020-07-08 DIAGNOSIS — E11.9: ICD-10-CM

## 2020-07-08 DIAGNOSIS — I10: ICD-10-CM

## 2020-07-08 DIAGNOSIS — D70.9: ICD-10-CM

## 2020-07-08 DIAGNOSIS — Z79.84: ICD-10-CM

## 2020-07-08 DIAGNOSIS — R63.4: ICD-10-CM

## 2020-07-08 DIAGNOSIS — I26.99: ICD-10-CM

## 2020-07-08 DIAGNOSIS — E87.6: ICD-10-CM

## 2020-07-08 DIAGNOSIS — R18.8: ICD-10-CM

## 2020-07-08 DIAGNOSIS — R53.81: ICD-10-CM

## 2020-07-08 DIAGNOSIS — D63.0: ICD-10-CM

## 2020-07-08 DIAGNOSIS — E43: ICD-10-CM

## 2020-07-08 DIAGNOSIS — Z11.59: ICD-10-CM

## 2020-07-08 DIAGNOSIS — C16.3: Primary | ICD-10-CM

## 2020-07-08 DIAGNOSIS — R65.21: ICD-10-CM

## 2020-07-08 PROCEDURE — 71260 CT THORAX DX C+: CPT

## 2020-07-08 PROCEDURE — 85014 HEMATOCRIT: CPT

## 2020-07-08 PROCEDURE — 36415 COLL VENOUS BLD VENIPUNCTURE: CPT

## 2020-07-08 PROCEDURE — 82150 ASSAY OF AMYLASE: CPT

## 2020-07-08 PROCEDURE — 82378 CARCINOEMBRYONIC ANTIGEN: CPT

## 2020-07-08 PROCEDURE — 85610 PROTHROMBIN TIME: CPT

## 2020-07-08 PROCEDURE — 82728 ASSAY OF FERRITIN: CPT

## 2020-07-08 PROCEDURE — 83540 ASSAY OF IRON: CPT

## 2020-07-08 PROCEDURE — 82550 ASSAY OF CK (CPK): CPT

## 2020-07-08 PROCEDURE — 43235 EGD DIAGNOSTIC BRUSH WASH: CPT

## 2020-07-08 PROCEDURE — 85025 COMPLETE CBC W/AUTO DIFF WBC: CPT

## 2020-07-08 PROCEDURE — 86850 RBC ANTIBODY SCREEN: CPT

## 2020-07-08 PROCEDURE — 83880 ASSAY OF NATRIURETIC PEPTIDE: CPT

## 2020-07-08 PROCEDURE — 82607 VITAMIN B-12: CPT

## 2020-07-08 PROCEDURE — 88309 TISSUE EXAM BY PATHOLOGIST: CPT

## 2020-07-08 PROCEDURE — 80053 COMPREHEN METABOLIC PANEL: CPT

## 2020-07-08 PROCEDURE — 83735 ASSAY OF MAGNESIUM: CPT

## 2020-07-08 PROCEDURE — 70450 CT HEAD/BRAIN W/O DYE: CPT

## 2020-07-08 PROCEDURE — 84484 ASSAY OF TROPONIN QUANT: CPT

## 2020-07-08 PROCEDURE — 87040 BLOOD CULTURE FOR BACTERIA: CPT

## 2020-07-08 PROCEDURE — 94002 VENT MGMT INPAT INIT DAY: CPT

## 2020-07-08 PROCEDURE — 82805 BLOOD GASES W/O2 SATURATION: CPT

## 2020-07-08 PROCEDURE — 36600 WITHDRAWAL OF ARTERIAL BLOOD: CPT

## 2020-07-08 PROCEDURE — 83605 ASSAY OF LACTIC ACID: CPT

## 2020-07-08 PROCEDURE — 83690 ASSAY OF LIPASE: CPT

## 2020-07-08 PROCEDURE — 80048 BASIC METABOLIC PNL TOTAL CA: CPT

## 2020-07-08 PROCEDURE — 85018 HEMOGLOBIN: CPT

## 2020-07-08 PROCEDURE — 84466 ASSAY OF TRANSFERRIN: CPT

## 2020-07-08 PROCEDURE — 82553 CREATINE MB FRACTION: CPT

## 2020-07-08 PROCEDURE — 93005 ELECTROCARDIOGRAM TRACING: CPT

## 2020-07-08 PROCEDURE — 71045 X-RAY EXAM CHEST 1 VIEW: CPT

## 2020-07-08 PROCEDURE — 85730 THROMBOPLASTIN TIME PARTIAL: CPT

## 2020-07-08 PROCEDURE — 82948 REAGENT STRIP/BLOOD GLUCOSE: CPT

## 2020-07-08 PROCEDURE — 84100 ASSAY OF PHOSPHORUS: CPT

## 2020-07-08 PROCEDURE — 93306 TTE W/DOPPLER COMPLETE: CPT

## 2020-07-08 PROCEDURE — 97139 UNLISTED THERAPEUTIC PX: CPT

## 2020-07-08 PROCEDURE — 82746 ASSAY OF FOLIC ACID SERUM: CPT

## 2020-07-08 PROCEDURE — 74177 CT ABD & PELVIS W/CONTRAST: CPT

## 2020-07-08 PROCEDURE — 85379 FIBRIN DEGRADATION QUANT: CPT

## 2020-07-08 PROCEDURE — 86900 BLOOD TYPING SEROLOGIC ABO: CPT

## 2020-07-08 PROCEDURE — 86920 COMPATIBILITY TEST SPIN: CPT

## 2020-07-08 NOTE — EMERGENCY DEPARTMENT NOTE
History of Present Illnes


History of Present Illness


Chief Complaint:  General Medicine Complaints


History of Present Illness


This is a 80 year old  male WITH C/O LOSS OF APPETITE AND WEIGHT OVER 

PAST FEW WEEKS, ADMITTED HERE FOR SAME ON JUNE 26, DURING THAT ADMISSION PT HAD 

CT SCAN OF ABD/PELVIS AND AN EGD DONE, PT STATES HIS PCP TOLD HIM TO COME TO ER 

TODAY TO GET A BLOOD TRANSFUSION TO HELP WITH HIS WEIGHT LOSS. PT DENIES N/V/D, 

DOES REPORT INTERMITTENT BLACK STOOLS, PT DID HAVE OCCULT POSITIVE STOOL ON LAST

ADMISSION, .


Historian:  Patient, Significant Other


Arrival Mode:  Car


Onset (how long ago):  week(s) (6)


Location:  ABD


Quality:  NO APPETITIE, WEIGHT LOSS


Radiation:  Reports non-radiation


Severity:  mild


Onset quality:  gradual


Duration (how long):  week(s) (6)


Timing of current episode:  constant


Progression:  unchanged


Chronicity:  chronic


Context:  Denies recent illness, Denies recent surgery


Relieving factors:  none


Exacerbating factors:  none


Associated symptoms:  Reports denies other symptoms





Past Medical/Family History


Physician Review


I have reviewed the patient's past medical and family history.  Any updates have

been documented here.





Past Medical History


Recent Fever:  No


Clinical Suspicion of Infectio:  No


New/Unexplained Change in Ment:  No


Past Medical History:  Diabetes





Social History


Smoking Cessation:  Never Smoker


Alcohol Use:  None


Any Illegal Drug Use:  No





Review of Systems


Review of Systems


Constitutional:  Reports no symptoms


EENTM:  Reports no symptoms


Cardiovascular:  Reports no symptoms


Respiratory:  Reports no symptoms


Gastrointestinal:  Reports as per HPI


Genitourinary:  Reports no symptoms


Musculoskeletal:  Reports no symptoms


Integumentary:  Reports no symptoms


Neurological:  Reports no symptoms


Psychological:  Reports no symptoms


Endocrine:  Reports no symptoms


Hematological/Lymphatic:  Reports no symptoms





Physical Exam


Related Data


Allergies:  


Coded Allergies:  


     No Known Allergies (Unverified , 6/26/20)


Triage Vital Signs





Vital Signs








  Date Time  Temp Pulse Resp B/P (MAP) Pulse Ox O2 Delivery O2 Flow Rate FiO2


 


7/8/20 23:28 97.8 73 17 100/63 100 Room Air  








Vital signs reviewed:  Yes





Physical Exam


CONSTITUTIONAL





Constitutional:  Present well-developed, Present well-nourished


HENT


HENT:  Present normocephalic, Present atraumatic, Present oropharynx 

clear/moist, Present nose normal


HENT L/R:  Present left ext ear normal, Present right ext ear normal


EYES





Eyes:  Reports PERRL, Reports conjunctivae normal


NECK


Neck:  Present ROM normal


PULMONARY


Pulmonary:  Present effort normal, Present breath sounds normal


CARDIOVASCULAR





Cardiovascular:  Present regular rhythm, Present heart sounds normal, Present 

capillary refill normal, Present normal rate


GASTROINTESTINAL





Abdominal:  Present soft, Present nontender, Present bowel sounds normal


GENITOURINARY





Genitourinary:  Present exam deferred


SKIN


Skin:  Present warm, Present dry


MUSCULOSKELETAL





Musculoskeletal:  Present ROM normal


NEUROLOGICAL





Neurological:  Present alert, Present oriented x 3, Present no gross motor or 

sensory deficits


PSYCHOLOGICAL


Psychological:  Present mood/affect normal, Present judgement normal





Results


Laboratory


Laboratory





Laboratory Tests








Test


 7/8/20


23:22


 


White Blood Count


 6.66 x10e3/uL


(4.8-10.8)


 


Red Blood Count


 2.59 x10e6/uL


(4.3-5.7)


 


Hemoglobin


 7.5 g/dL


(14.0-18.0)


 


Hematocrit


 23.4 %


(38.2-49.6)


 


Mean Corpuscular Volume


 90.3 fL


(81-99)


 


Mean Corpuscular Hemoglobin


 29.0 pg


(28-32)


 


Mean Corpuscular Hemoglobin


Concent 32.1 g/dL


(31-35)


 


Red Cell Distribution Width


 16.6 %


(11.7-14.4)


 


Platelet Count


 309 x10e3/uL


(140-360)


 


Neutrophils (%) (Auto)


 63.8 %


(38.7-80.0)


 


Lymphocytes (%) (Auto)


 18.9 %


(18.0-39.1)


 


Monocytes (%) (Auto)


 14.9  %


(4.4-11.3)


 


Eosinophils (%) (Auto)


 1.5 %


(0.0-6.0)


 


Basophils (%) (Auto)


 0.6 %


(0.0-1.0)


 


Neutrophils # (Auto) 4.3 (2.1-6.9) 


 


Lymphocytes # (Auto) 1.3 (1.0-3.2) 


 


Monocytes # (Auto) 1.0 (0.2-0.8) 


 


Eosinophils # (Auto) 0.1 (0.0-0.4) 


 


Basophils # (Auto) 0.0 (0.0-0.1) 


 


Absolute Immature Granulocyte


(auto 0.02 x10e3/uL


(0-0.1)


 


Sodium Level


 134 mmol/L


(136-145)


 


Potassium Level


 4.2 mmol/L


(3.5-5.1)


 


Chloride Level


 98 mmol/L


()


 


Carbon Dioxide Level


 27 mmol/L


(22-29)


 


Anion Gap


 13.2 mmol/L


(8-16)


 


Blood Urea Nitrogen


 33 mg/dL


(7-26)


 


Creatinine


 0.95 mg/dL


(0.72-1.25)


 


Estimat Glomerular Filtration


Rate > 60 ML/MIN


(60-)


 


BUN/Creatinine Ratio 35 (6-25) 


 


Glucose Level


 158 mg/dL


()


 


Calcium Level


 8.5 mg/dL


(8.4-10.2)


 


Total Bilirubin


 0.3 mg/dL


(0.2-1.2)


 


Aspartate Amino Transf


(AST/SGOT) 28 IU/L (5-34) 





 


Alanine Aminotransferase


(ALT/SGPT) 17 IU/L (0-55) 





 


Alkaline Phosphatase


 65 IU/L


()


 


Total Protein


 5.8 g/dL


(6.5-8.1)


 


Albumin


 2.8 g/dL


(3.5-5.0)


 


Globulin


 3.0 g/dL


(2.3-3.5)


 


Albumin/Globulin Ratio 0.9 (0.8-2.0) 


 


Amylase Level


 48 U/L


()


 


Lipase 93 U/L (8-78) 








Lab results reviewed:  Yes


Laboratory comments


pt has hgb drop of 2 grams in last 2 weeks,





Assessment & Plan


Medical Decision Making


MDM


PT WITH WEIGHT LOSS, LOSS OF APPETITE AND OCCULT POSITIVE STOOLS, TOLD HE NEEDED

TO COME TO ER BY PCP FOR A BLOOD TRANSFUSION.





CBC, CMP, ORDERED TO EVAL FOR ANEMIA, ELECTROLYTE ABNORMALITY, ELEVATED LFT'S





pt found to have hgb of 7.5





i spoke with dr mccann and dr clinton rivero, admit pt to inpatient.





Reassessment


Reassessment time:  01:01


Reassessment


pt with no change in symptoms, i went over lab work with patient and informed 

him of decision to admit him to hospital again for a possible blood transfusion 

and to have endoscopy done again,





Assessment & Plan


Final Impression:  


(1) Anemia


(2) Weight loss


(3) GI bleed


Depart Disposition:  ADMITTED


Last Vital Signs











  Date Time  Temp Pulse Resp B/P (MAP) Pulse Ox O2 Delivery O2 Flow Rate FiO2


 


7/8/20 23:28 97.8 73 17 100/63 100 Room Air  








Home Meds


Active Scripts


Finasteride (FINASTERIDE) 5 Mg Tablet, 5 MG PO DAILY, #30 TAB 1 Refill


   Prov:SUNITA LINN MD, [facility]         6/28/20


Tamsulosin Hcl* (FLOMAX*) 0.4 Mg Cap, 0.4 MG PO DAILY for 30 Days, #30 CAP 0 

Refills


   Prov:SUNITA LINN MD, JUDY         6/28/20


Multivitamin With Minerals (MULTIVITAMINS WITH MINERALS) 1 Each Tablet, 1 TAB PO

DAILY for 30 Days, 6 Refills


   Prov:SUNITA LINN MD, [facility]         6/28/20


Esomeprazole Magnesium (NEXIUM) 40 Mg Capsule.dr, 40 MG PO DAILY for 30 Days, 

CAP 1 Refill


   [employer]APE[employer] FOR NEXIUM PER [facility]


   Prov:SUNITA LINN MD, [facility]         6/28/20


Reported Medications


[Iron]   No Conflict Check, 1 TAB PO TID


   6/26/20


Metformin Hcl (METFORMIN HCL) 500 Mg Tablet, 500 MG PO BID, #60 TAB


   6/26/20











BREE MORLEY MD         Jul 8, 2020 23:41

## 2020-07-09 VITALS — DIASTOLIC BLOOD PRESSURE: 61 MMHG | SYSTOLIC BLOOD PRESSURE: 107 MMHG

## 2020-07-09 VITALS — SYSTOLIC BLOOD PRESSURE: 123 MMHG | DIASTOLIC BLOOD PRESSURE: 53 MMHG

## 2020-07-09 VITALS — SYSTOLIC BLOOD PRESSURE: 102 MMHG | DIASTOLIC BLOOD PRESSURE: 57 MMHG

## 2020-07-09 VITALS — DIASTOLIC BLOOD PRESSURE: 57 MMHG | SYSTOLIC BLOOD PRESSURE: 89 MMHG

## 2020-07-09 VITALS — DIASTOLIC BLOOD PRESSURE: 66 MMHG | SYSTOLIC BLOOD PRESSURE: 122 MMHG

## 2020-07-09 VITALS — SYSTOLIC BLOOD PRESSURE: 107 MMHG | DIASTOLIC BLOOD PRESSURE: 61 MMHG

## 2020-07-09 VITALS — DIASTOLIC BLOOD PRESSURE: 57 MMHG | SYSTOLIC BLOOD PRESSURE: 96 MMHG

## 2020-07-09 LAB
ALBUMIN SERPL-MCNC: 2.8 G/DL (ref 3.5–5)
ALBUMIN/GLOB SERPL: 0.9 {RATIO} (ref 0.8–2)
ALP SERPL-CCNC: 65 IU/L (ref 40–150)
ALT SERPL-CCNC: 17 IU/L (ref 0–55)
AMYLASE SERPL-CCNC: 48 U/L (ref 25–125)
ANION GAP SERPL CALC-SCNC: 13.2 MMOL/L (ref 8–16)
BASOPHILS # BLD AUTO: 0 10*3/UL (ref 0–0.1)
BASOPHILS NFR BLD AUTO: 0.6 % (ref 0–1)
BUN SERPL-MCNC: 33 MG/DL (ref 7–26)
BUN/CREAT SERPL: 35 (ref 6–25)
CALCIUM SERPL-MCNC: 8.5 MG/DL (ref 8.4–10.2)
CHLORIDE SERPL-SCNC: 98 MMOL/L (ref 98–107)
CO2 SERPL-SCNC: 27 MMOL/L (ref 22–29)
DEPRECATED NEUTROPHILS # BLD AUTO: 4.3 10*3/UL (ref 2.1–6.9)
EGFRCR SERPLBLD CKD-EPI 2021: > 60 ML/MIN (ref 60–?)
EOSINOPHIL # BLD AUTO: 0.1 10*3/UL (ref 0–0.4)
EOSINOPHIL NFR BLD AUTO: 1.5 % (ref 0–6)
ERYTHROCYTE [DISTWIDTH] IN CORD BLOOD: 16.6 % (ref 11.7–14.4)
FERRITIN SERPL-MCNC: 24.08 NG/ML (ref 21.81–274.66)
GLOBULIN PLAS-MCNC: 3 G/DL (ref 2.3–3.5)
GLUCOSE SERPLBLD-MCNC: 158 MG/DL (ref 74–118)
HCT VFR BLD AUTO: 22.4 % (ref 38.2–49.6)
HCT VFR BLD AUTO: 23.3 % (ref 38.2–49.6)
HCT VFR BLD AUTO: 23.4 % (ref 38.2–49.6)
HGB BLD-MCNC: 7 G/DL (ref 14–18)
HGB BLD-MCNC: 7.2 G/DL (ref 14–18)
HGB BLD-MCNC: 7.5 G/DL (ref 14–18)
IRON SATN MFR SERPL: 19 % (ref 15–50)
IRON SERPL-MCNC: 62 UG/DL (ref 65–175)
LIPASE SERPL-CCNC: 93 U/L (ref 8–78)
LYMPHOCYTES # BLD: 1.3 10*3/UL (ref 1–3.2)
LYMPHOCYTES NFR BLD AUTO: 18.9 % (ref 18–39.1)
MCH RBC QN AUTO: 29 PG (ref 28–32)
MCHC RBC AUTO-ENTMCNC: 32.1 G/DL (ref 31–35)
MCV RBC AUTO: 90.3 FL (ref 81–99)
MONOCYTES # BLD AUTO: 1 10*3/UL (ref 0.2–0.8)
MONOCYTES NFR BLD AUTO: 14.9 % (ref 4.4–11.3)
NEUTS SEG NFR BLD AUTO: 63.8 % (ref 38.7–80)
PLATELET # BLD AUTO: 309 X10E3/UL (ref 140–360)
POTASSIUM SERPL-SCNC: 4.2 MMOL/L (ref 3.5–5.1)
RBC # BLD AUTO: 2.59 X10E6/UL (ref 4.3–5.7)
SODIUM SERPL-SCNC: 134 MMOL/L (ref 136–145)
TIBC SERPL-MCNC: 322 UG/DL (ref 261–478)
TRANSFERRIN SERPL-MCNC: 230 MG/DL (ref 174–364)

## 2020-07-09 PROCEDURE — 30233N1 TRANSFUSION OF NONAUTOLOGOUS RED BLOOD CELLS INTO PERIPHERAL VEIN, PERCUTANEOUS APPROACH: ICD-10-PCS | Performed by: INTERNAL MEDICINE

## 2020-07-09 RX ADMIN — INSULIN LISPRO SCH UNIT: 100 INJECTION, SOLUTION INTRAVENOUS; SUBCUTANEOUS at 16:30

## 2020-07-09 RX ADMIN — INSULIN LISPRO SCH UNIT: 100 INJECTION, SOLUTION INTRAVENOUS; SUBCUTANEOUS at 11:55

## 2020-07-09 RX ADMIN — TAMSULOSIN HYDROCHLORIDE SCH MG: 0.4 CAPSULE ORAL at 18:00

## 2020-07-09 RX ADMIN — SODIUM CHLORIDE SCH MG: 900 INJECTION INTRAVENOUS at 16:45

## 2020-07-09 RX ADMIN — SODIUM CHLORIDE SCH MG: 900 INJECTION INTRAVENOUS at 08:15

## 2020-07-09 RX ADMIN — Medication SCH MG: at 11:55

## 2020-07-09 RX ADMIN — INSULIN LISPRO SCH UNIT: 100 INJECTION, SOLUTION INTRAVENOUS; SUBCUTANEOUS at 20:46

## 2020-07-09 NOTE — NUR
1st unit of blood is still infusing with no problems. Patient denies needing anything at 
this time, call light in reach

## 2020-07-09 NOTE — HISTORY AND PHYSICAL
PRIMARY CARE PHYSICIAN:  Gordon Monson MD.

 

CONSULTANT:  Capo Luque MD.

 

CHIEF COMPLAINT:  Progressive weakness, progressive weight loss, severe anemia with

melena. 

 

HISTORY OF PRESENT ILLNESS:  This is an 80-year-old male, recently admitted from June 26, 2020 to June 28, 2020 for anemia.  The patient was found to have dysphagia as well.

He underwent EGD and biopsy done.  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

adenocarcinoma, invasive.  The patient also now complains of melena, he is also

progressively weak and weight loss as well.  The patient's hemoglobin and hematocrit are

7 and 22.4 respectively.  The patient is pending for chemotherapy and iron infusion, and

also consultation with oncologist.  The patient is otherwise clinically stable at this

time.  He did receive boluses of IV fluid due to hypotensive episode at night in the ER.

 The patient is otherwise stable now. 

 

PAST MEDICAL HISTORY:  Recent diagnosis of gastric cancer, mucinous adenocarcinoma

invasive after EGD done on last admission.  Progressive weight loss.  Diabetes type 2,

hypertension, and enlarged prostate. 

 

PAST SURGICAL HISTORY:  EGD done, found invasive gastric carcinoma as mentioned above.

 

SOCIAL HISTORY:  The patient does not smoke or use alcohol.  No regular drugs.

 

ALLERGIES:  NO KNOWN ALLERGIES.

 

HOME MEDICATIONS:  He is on:

1. Nexium.

2. Ferrous sulfate.

3. Finasteride.

4. Metformin.

5. Multivitamins.

6. Flomax.

 

PHYSICAL EXAMINATION:

VITAL SIGNS:  Temperature is 98, blood pressure is 107/61, pulse rate 60, respirations

20. 

GENERAL:  The patient is not in acute distress.  He is seen with weight loss. 

HEENT:  Normocephalic and atraumatic.  He is anicteric. 

NECK:  Supple grossly. 

PULMONARY:  Clear. 

CARDIOVASCULAR:  Regular rate and rhythm. 

ABDOMEN:  Soft, cachexia. 

EXTREMITIES:  No cyanosis or edema. 

NEUROLOGIC:  No focal deficit.

LABORATORY DATA:  Sodium 134, potassium 4.2, chloride 98, bicarb 27, BUN 33, creatinine

0.9, glucose 158. 

 

WBC 6.6, hemoglobin 7, hematocrit 22, and platelets 304.

 

IMPRESSION:  

1. Persistent recurrent anemia, acute-on-chronic blood loss anemia secondary to gastric

cancer, mucinous invasive adenocarcinoma. 

2. Progressive weight loss due to the gastric carcinoma.

3. Anemia, need blood transfusion.

4. Medical debility.

5. Anorexia, dysphagia.

6. Diabetes type 2.

7. Enlarged prostate.

 

PLAN:  Blood transfusion 2 units.  After that, iron infusion.  I doubt that the oral

iron works for this patient, who has thickening gastric cancer, adenocarcinoma.  PPI for

now.  The patient may not need a repeated EGD.  We will consult Dr. Annel Gaston,

oncologist.  Repeat the lab work in the morning.  Supportive measure.  The patient may

need appetite stimulant.  We will place the patient on Reglan 5 mg before meal when

started.  We will continue to monitor this patient closely.  Again, this patient will

need diet and supplementary support once no procedure is planned, either today or

tomorrow. 

 

 

 

 

______________________________

MD VICTORIA Melton/NORMA

D:  07/09/2020 08:39:53

T:  07/09/2020 09:19:11

Job #:  [id number]

 

cc:            Gordon Monson MD

## 2020-07-09 NOTE — NUR
Called Dr. Mccormick to confirm if he wants to give Lasix after this unit due to blood pressure 
running in the low 90's. Per Dr. Mccormick just give one time dose of lasix after 2nd unit.

## 2020-07-09 NOTE — NUR
WALKING ROUNDS PERFORMED, RECEIVED PT LAYING SEMI FOWLERS IN BED, AAOX3, RR EVEN AND 
NON-LABORED, ON ROOM AIR. NO S/SX OF DISTRESS NOTED. PT RECEIVING BLOOD TRANSFUSION TO (R) 
AC AT 125ML/HR. LEFT PT LAYING SEMI FOWLERS IN BED, BED IN LOW LOCKED POSITION, SIDE RAILS 
UPX2, CALL LIGHT AND PHONE WITHIN REACH.

## 2020-07-10 VITALS — SYSTOLIC BLOOD PRESSURE: 101 MMHG | DIASTOLIC BLOOD PRESSURE: 64 MMHG

## 2020-07-10 VITALS — SYSTOLIC BLOOD PRESSURE: 107 MMHG | DIASTOLIC BLOOD PRESSURE: 60 MMHG

## 2020-07-10 VITALS — DIASTOLIC BLOOD PRESSURE: 61 MMHG | SYSTOLIC BLOOD PRESSURE: 105 MMHG

## 2020-07-10 VITALS — DIASTOLIC BLOOD PRESSURE: 59 MMHG | SYSTOLIC BLOOD PRESSURE: 98 MMHG

## 2020-07-10 VITALS — SYSTOLIC BLOOD PRESSURE: 100 MMHG | DIASTOLIC BLOOD PRESSURE: 56 MMHG

## 2020-07-10 VITALS — DIASTOLIC BLOOD PRESSURE: 60 MMHG | SYSTOLIC BLOOD PRESSURE: 107 MMHG

## 2020-07-10 VITALS — DIASTOLIC BLOOD PRESSURE: 64 MMHG | SYSTOLIC BLOOD PRESSURE: 121 MMHG

## 2020-07-10 LAB
ANION GAP SERPL CALC-SCNC: 12.8 MMOL/L (ref 8–16)
BASOPHILS # BLD AUTO: 0 10*3/UL (ref 0–0.1)
BASOPHILS NFR BLD AUTO: 0.5 % (ref 0–1)
BUN SERPL-MCNC: 21 MG/DL (ref 7–26)
BUN/CREAT SERPL: 28 (ref 6–25)
CALCIUM SERPL-MCNC: 8 MG/DL (ref 8.4–10.2)
CHLORIDE SERPL-SCNC: 101 MMOL/L (ref 98–107)
CO2 SERPL-SCNC: 27 MMOL/L (ref 22–29)
DEPRECATED NEUTROPHILS # BLD AUTO: 6.2 10*3/UL (ref 2.1–6.9)
EGFRCR SERPLBLD CKD-EPI 2021: > 60 ML/MIN (ref 60–?)
EOSINOPHIL # BLD AUTO: 0.1 10*3/UL (ref 0–0.4)
EOSINOPHIL NFR BLD AUTO: 0.8 % (ref 0–6)
ERYTHROCYTE [DISTWIDTH] IN CORD BLOOD: 16.3 % (ref 11.7–14.4)
GLUCOSE SERPLBLD-MCNC: 89 MG/DL (ref 74–118)
HCT VFR BLD AUTO: 30.4 % (ref 38.2–49.6)
HCT VFR BLD AUTO: 33.6 % (ref 38.2–49.6)
HGB BLD-MCNC: 10.8 G/DL (ref 14–18)
HGB BLD-MCNC: 9.8 G/DL (ref 14–18)
LYMPHOCYTES # BLD: 0.8 10*3/UL (ref 1–3.2)
LYMPHOCYTES NFR BLD AUTO: 9.5 % (ref 18–39.1)
MCH RBC QN AUTO: 28.3 PG (ref 28–32)
MCHC RBC AUTO-ENTMCNC: 32.2 G/DL (ref 31–35)
MCV RBC AUTO: 87.9 FL (ref 81–99)
MONOCYTES # BLD AUTO: 0.8 10*3/UL (ref 0.2–0.8)
MONOCYTES NFR BLD AUTO: 9.9 % (ref 4.4–11.3)
NEUTS SEG NFR BLD AUTO: 78.9 % (ref 38.7–80)
PLATELET # BLD AUTO: 239 X10E3/UL (ref 140–360)
POTASSIUM SERPL-SCNC: 3.8 MMOL/L (ref 3.5–5.1)
RBC # BLD AUTO: 3.46 X10E6/UL (ref 4.3–5.7)
SODIUM SERPL-SCNC: 137 MMOL/L (ref 136–145)

## 2020-07-10 PROCEDURE — 0DB78ZX EXCISION OF STOMACH, PYLORUS, VIA NATURAL OR ARTIFICIAL OPENING ENDOSCOPIC, DIAGNOSTIC: ICD-10-PCS | Performed by: INTERNAL MEDICINE

## 2020-07-10 PROCEDURE — 0DB68ZX EXCISION OF STOMACH, VIA NATURAL OR ARTIFICIAL OPENING ENDOSCOPIC, DIAGNOSTIC: ICD-10-PCS | Performed by: INTERNAL MEDICINE

## 2020-07-10 RX ADMIN — METOCLOPRAMIDE SCH MG: 10 TABLET ORAL at 16:30

## 2020-07-10 RX ADMIN — Medication SCH MG: at 09:00

## 2020-07-10 RX ADMIN — MEGESTROL ACETATE SCH MG: 40 TABLET ORAL at 09:00

## 2020-07-10 RX ADMIN — METOCLOPRAMIDE SCH MG: 10 TABLET ORAL at 22:15

## 2020-07-10 RX ADMIN — Medication SCH EA: at 17:00

## 2020-07-10 RX ADMIN — INSULIN LISPRO SCH UNIT: 100 INJECTION, SOLUTION INTRAVENOUS; SUBCUTANEOUS at 11:30

## 2020-07-10 RX ADMIN — INSULIN LISPRO SCH UNIT: 100 INJECTION, SOLUTION INTRAVENOUS; SUBCUTANEOUS at 16:22

## 2020-07-10 RX ADMIN — INSULIN LISPRO SCH UNIT: 100 INJECTION, SOLUTION INTRAVENOUS; SUBCUTANEOUS at 07:30

## 2020-07-10 RX ADMIN — Medication SCH EA: at 09:00

## 2020-07-10 RX ADMIN — METOCLOPRAMIDE SCH MG: 10 TABLET ORAL at 11:30

## 2020-07-10 RX ADMIN — INSULIN LISPRO SCH UNIT: 100 INJECTION, SOLUTION INTRAVENOUS; SUBCUTANEOUS at 21:00

## 2020-07-10 RX ADMIN — SODIUM CHLORIDE SCH MG: 900 INJECTION INTRAVENOUS at 17:00

## 2020-07-10 RX ADMIN — MIRTAZAPINE SCH MG: 15 TABLET, FILM COATED ORAL at 22:15

## 2020-07-10 RX ADMIN — MEGESTROL ACETATE SCH MG: 40 TABLET ORAL at 17:00

## 2020-07-10 RX ADMIN — TAMSULOSIN HYDROCHLORIDE SCH MG: 0.4 CAPSULE ORAL at 17:00

## 2020-07-10 RX ADMIN — SODIUM CHLORIDE SCH MG: 900 INJECTION INTRAVENOUS at 09:00

## 2020-07-10 NOTE — DIAGNOSTIC IMAGING REPORT
CT of the chest.



Comparison: None



Clinical History:  Gastrocarcinoma rule out metastasis to the lung.



Technique: Helical CT scan of the chest was performed from just above the

thoracic inlet through the adrenal glands.  Intravenous contrast administration

was utilized.  Coronal and sagittal reconstructions were generated from the raw

data.  Multiple images were submitted for interpretation.



This exam was performed according to our departmental dose-optimization program

which includes automated exposure control, adjustment of the mA and/or kV

according to patient size 



Discussion:



Lung fields: There is presence of right apical lung scarring. There is also

presence of 5.1 mm nodule in the right upper lobe and the level of the hilum

image #47. There is surrounding pulmonary scarring. There is presence of

scarring and traction bronchiectasis in the lingular lobe in the perihilar

location image #63. There is presence of 2 nodules 4.3 mm and 1.9 mm

respectively in the right middle lobe image #72. There is presence of

additional scarring and bronchiectasis in the right middle lobe. There is

another nodule measuring 6.9 mm in the right middle lobe image #79. This has

surrounding scarring and some spiculation. There is presence of an additional

focus of bronchiectasis and scarring and associated nodule with spiculation in

the lingular lobe image #88. The nodule that measures approximately 12 mm. Most

of these are more consistent with inflammation and scarring for example as a

result of repeat infection/inflammation/aspiration. These are less suggestive

of metastatic foci from an adenocarcinoma. But of course, metastasis cannot be

ruled out with this history.

Central airways:  As above. Otherwise unremarkable

Pleural spaces and pleura:  Unremarkable

Pulmonary geovanny: Normal

Mediastinum:  Unremarkable

Cardiac chambers and pericardium:  Unremarkable

Systemic great vessels:  Minimal calcific atherosclerosis

Central pulmonary vessels:  Unremarkable

Thyroid:  Unremarkable

Lymph nodes:  No thoracic lymphadenopathy. There is suspicion of

intra-abdominal lymphadenopathy. There is presence of soft tissue nodularity

around the celiac axis along the lesser curvature of the stomach. This could

represent direct infiltration and/or lymph nodes.

Azygos vein:  Unremarkable

The esophagus: Dilated with fluid and air.

Thoracic duct: Unremarkable. 

Osseous structures: No lytic metastasis. Degenerative changes.

Upper abdomen:  Wall thickening of the gastric antrum and pylorus is once again

noted that approaches at least 16 mm on each side. This is consistent with the

known history of gastric carcinoma. Additionally there is presence of enteric

stranding in the greater omentum. There is presence of an approximately 2.7 cm

cystic structure in the left renal pelvis. This could represent a parapelvic

cyst.

Body wall: Cachexia.

Breasts: Unremarkable

Axilla: Unremarkable

Lower neck: Unremarkable.

Impression:

Pulmonary parenchymal changes primarily scarring, traction bronchiectasis and

nodular opacities which look more like infection, inflammation, aspiration

related but metastases with this history are not ruled out.



Signed by: Justin Frost MD on 7/10/2020 1:51 PM

## 2020-07-10 NOTE — NUR
AC TO FAMILY THEY SAID DR IS GOING TO DO SURGERY THEY NEED MORE EXPLANATION SO PAGED DR ROMINA HARMON

## 2020-07-10 NOTE — NUR
DR ESPINOSA RETURNED THE CALL  SINCE HE IS COVERING DR ROMINA HARMON HE SAID HE WILL EXPLAIN TO THE 
FAMILY ON TOMORROW WHILE TAKING ROUNDS

## 2020-07-10 NOTE — NUR
CONSULTATION CALLED TO MD ISABELLA CARDOSO, LEFT MESSAGE WITH ANSWERING SERVICE, WAITING FOR 
CALLBACK.

## 2020-07-10 NOTE — NUR
PT BACK AFTER PROCEDURE PT IS ALERT AND ORIENTED VITALS CHECKED PT RESTING ON BED BED LOW 
AND LOCKED CALL LIGHT IN REACH

## 2020-07-10 NOTE — NUR
SPOKE WITH MD ISABELLA CARDOSO CONCERNING CONSULTATION. NO NEW ORDERS RECEIVED, WILL SEE PATIENT 
LATER TODAY.

## 2020-07-10 NOTE — NUR
RCD PT AT BED PT IS ALERT AND ORIENTED PT RESTING ON BED IV PATENT BY SALINE FLUSH  PT NPO 
FOR PROCEDURE BED LOW AND LOCKED CALL LIGHT IN REACH

## 2020-07-10 NOTE — NUR
Nutrition Intervention Note



RD Recommendation(s) for Physician: 

-Continue regular diet

-Recommend Glucerna with meals for added nutrition

The patient meets criteria for unspecified SEVERE protein-calorie malnutrition. 



Plan of Care: RD following, monitoring for tolerance and adequacy, oral supplement 
recommendation



Nutrition reason for involvement: consult and Nutrition Risk Trigger



RD Assessment

(7/10/20) Pt is an 80 year old male admitted with anemia, GI bleed, and weight loss. Pt had 
an EGD with biopsy today per chart which showed a large amount of retained food and the 
antrum was ulcerated and friable. It is recorded that pt consumed 50-75% of meals today, but 
prior to admission, pt reports not eating much since June 27th. Pt also reports weight loss 
and he had weighed 125 lbs in April. Pt currently weighs 106 lbs. This would be 15% weight 
loss in the past 3 months which is considered to be significant weight loss. Pt is currently 
on an appetite stimulant. Recommend Glucerna with meals for added nutrition. Will continue 
to monitor.



Principal Problems/Diagnoses:  anemia, GI bleed, weight loss



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 
on last admission. Progressive weight loss, Diabetes type 2, hypertension, and enlarged 
prostate.



GI: soft, non-tender round, abdomen 



Skin: intact



Labs: (7/10) Na 137, K 3.8, Cr 0.76, Glu 89, Ca 8.0



Meds: IV iron, reglan, insulin, senokot, megace, protonix, lasix, zofran



Ht: 62 inches

Wt: 106 lbs

BMI: 19.4 kg/m2

IBW: 118 lbs





Malnutrition Evaluation (7/10/20)

The patient meets criteria for unspecified SEVERE protein-calorie malnutrition. 



Energy intake:

<50% of estimated energy requirements for >5 days



Weight loss:

>7.5% in 3 months (Acute)



Fat loss: unable to evaluate

Muscle loss: unable to evaluate



Supporting Evidence:

Fluid accumulation: no edema per MD note

Functional Status: unable to assess



Nutrition Prescription (Diet Order):regular



Estimated Nutritional Needs:

5907-9356 calories/day (25-35 kcal/kg CBW)

48-72 g protein/day (1-1.5 g pro/kg CBW)



Diet Adequacy:

Not meeting calorie needs, Not meeting protein needs



Tolerance: Tolerating PO 





Diet Education Needs Assessment: Diet education not indicated; patient on regular diet. 



Nutrition Care Level: moderate



Nutrition Diagnosis: Severe malnutrition related to h/o inadequate intake as evidenced by pt 
meeting <50% of estimated energy needs for > 5 days and >7.5% weight loss in 3 months.





Goal: Patient will meet % of estimated needs by follow up 



Progress: N/A



Interventions:

-General healthful diet, Commercial beverage



Monitoring/Evaluation:

-Total energy intake, Total protein intake, Liquid supplement, Weight change





Signed: Ce Ba RD, LD

## 2020-07-11 VITALS — SYSTOLIC BLOOD PRESSURE: 98 MMHG | DIASTOLIC BLOOD PRESSURE: 58 MMHG

## 2020-07-11 VITALS — SYSTOLIC BLOOD PRESSURE: 94 MMHG | DIASTOLIC BLOOD PRESSURE: 59 MMHG

## 2020-07-11 VITALS — DIASTOLIC BLOOD PRESSURE: 64 MMHG | SYSTOLIC BLOOD PRESSURE: 116 MMHG

## 2020-07-11 VITALS — SYSTOLIC BLOOD PRESSURE: 99 MMHG | DIASTOLIC BLOOD PRESSURE: 64 MMHG

## 2020-07-11 VITALS — SYSTOLIC BLOOD PRESSURE: 101 MMHG | DIASTOLIC BLOOD PRESSURE: 62 MMHG

## 2020-07-11 VITALS — DIASTOLIC BLOOD PRESSURE: 65 MMHG | SYSTOLIC BLOOD PRESSURE: 110 MMHG

## 2020-07-11 LAB
BASOPHILS # BLD AUTO: 0 10*3/UL (ref 0–0.1)
BASOPHILS NFR BLD AUTO: 0.4 % (ref 0–1)
DEPRECATED NEUTROPHILS # BLD AUTO: 5.2 10*3/UL (ref 2.1–6.9)
EOSINOPHIL # BLD AUTO: 0 10*3/UL (ref 0–0.4)
EOSINOPHIL NFR BLD AUTO: 0.5 % (ref 0–6)
ERYTHROCYTE [DISTWIDTH] IN CORD BLOOD: 16.3 % (ref 11.7–14.4)
HCT VFR BLD AUTO: 31.3 % (ref 38.2–49.6)
HGB BLD-MCNC: 9.9 G/DL (ref 14–18)
LYMPHOCYTES # BLD: 1.2 10*3/UL (ref 1–3.2)
LYMPHOCYTES NFR BLD AUTO: 16 % (ref 18–39.1)
MCH RBC QN AUTO: 28.8 PG (ref 28–32)
MCHC RBC AUTO-ENTMCNC: 31.6 G/DL (ref 31–35)
MCV RBC AUTO: 91 FL (ref 81–99)
MONOCYTES # BLD AUTO: 0.8 10*3/UL (ref 0.2–0.8)
MONOCYTES NFR BLD AUTO: 10.9 % (ref 4.4–11.3)
NEUTS SEG NFR BLD AUTO: 71.8 % (ref 38.7–80)
PLATELET # BLD AUTO: 241 X10E3/UL (ref 140–360)
RBC # BLD AUTO: 3.44 X10E6/UL (ref 4.3–5.7)

## 2020-07-11 RX ADMIN — Medication SCH EA: at 09:00

## 2020-07-11 RX ADMIN — INSULIN LISPRO SCH UNIT: 100 INJECTION, SOLUTION INTRAVENOUS; SUBCUTANEOUS at 16:30

## 2020-07-11 RX ADMIN — SODIUM CHLORIDE SCH MLS/HR: 9 INJECTION, SOLUTION INTRAVENOUS at 12:00

## 2020-07-11 RX ADMIN — METOCLOPRAMIDE SCH MG: 10 TABLET ORAL at 16:30

## 2020-07-11 RX ADMIN — SODIUM CHLORIDE SCH MG: 900 INJECTION INTRAVENOUS at 09:00

## 2020-07-11 RX ADMIN — TAMSULOSIN HYDROCHLORIDE SCH MG: 0.4 CAPSULE ORAL at 16:59

## 2020-07-11 RX ADMIN — METOCLOPRAMIDE SCH MG: 10 TABLET ORAL at 20:50

## 2020-07-11 RX ADMIN — METOCLOPRAMIDE SCH MG: 10 TABLET ORAL at 07:30

## 2020-07-11 RX ADMIN — MEGESTROL ACETATE SCH MG: 40 TABLET ORAL at 16:59

## 2020-07-11 RX ADMIN — Medication SCH EA: at 16:59

## 2020-07-11 RX ADMIN — MIRTAZAPINE SCH MG: 15 TABLET, FILM COATED ORAL at 20:50

## 2020-07-11 RX ADMIN — INSULIN LISPRO SCH UNIT: 100 INJECTION, SOLUTION INTRAVENOUS; SUBCUTANEOUS at 11:30

## 2020-07-11 RX ADMIN — SODIUM CHLORIDE SCH MG: 900 INJECTION INTRAVENOUS at 16:59

## 2020-07-11 RX ADMIN — INSULIN LISPRO SCH UNIT: 100 INJECTION, SOLUTION INTRAVENOUS; SUBCUTANEOUS at 07:30

## 2020-07-11 RX ADMIN — MEGESTROL ACETATE SCH MG: 40 TABLET ORAL at 09:00

## 2020-07-11 RX ADMIN — METOCLOPRAMIDE SCH MG: 10 TABLET ORAL at 11:30

## 2020-07-11 RX ADMIN — Medication SCH MG: at 09:00

## 2020-07-11 RX ADMIN — INSULIN LISPRO SCH UNIT: 100 INJECTION, SOLUTION INTRAVENOUS; SUBCUTANEOUS at 20:39

## 2020-07-12 VITALS — DIASTOLIC BLOOD PRESSURE: 55 MMHG | SYSTOLIC BLOOD PRESSURE: 97 MMHG

## 2020-07-12 VITALS — DIASTOLIC BLOOD PRESSURE: 53 MMHG | SYSTOLIC BLOOD PRESSURE: 92 MMHG

## 2020-07-12 VITALS — SYSTOLIC BLOOD PRESSURE: 130 MMHG | DIASTOLIC BLOOD PRESSURE: 74 MMHG

## 2020-07-12 VITALS — DIASTOLIC BLOOD PRESSURE: 57 MMHG | SYSTOLIC BLOOD PRESSURE: 103 MMHG

## 2020-07-12 VITALS — DIASTOLIC BLOOD PRESSURE: 51 MMHG | SYSTOLIC BLOOD PRESSURE: 96 MMHG

## 2020-07-12 VITALS — SYSTOLIC BLOOD PRESSURE: 96 MMHG | DIASTOLIC BLOOD PRESSURE: 51 MMHG

## 2020-07-12 VITALS — DIASTOLIC BLOOD PRESSURE: 62 MMHG | SYSTOLIC BLOOD PRESSURE: 110 MMHG

## 2020-07-12 LAB
ALBUMIN SERPL-MCNC: 2 G/DL (ref 3.5–5)
ALBUMIN/GLOB SERPL: 0.8 {RATIO} (ref 0.8–2)
ALP SERPL-CCNC: 60 IU/L (ref 40–150)
ALT SERPL-CCNC: 14 IU/L (ref 0–55)
ANION GAP SERPL CALC-SCNC: 9.2 MMOL/L (ref 8–16)
BUN SERPL-MCNC: 17 MG/DL (ref 7–26)
BUN/CREAT SERPL: 24 (ref 6–25)
CALCIUM SERPL-MCNC: 7.6 MG/DL (ref 8.4–10.2)
CHLORIDE SERPL-SCNC: 106 MMOL/L (ref 98–107)
CO2 SERPL-SCNC: 26 MMOL/L (ref 22–29)
EGFRCR SERPLBLD CKD-EPI 2021: > 60 ML/MIN (ref 60–?)
GLOBULIN PLAS-MCNC: 2.6 G/DL (ref 2.3–3.5)
GLUCOSE SERPLBLD-MCNC: 183 MG/DL (ref 74–118)
POTASSIUM SERPL-SCNC: 4.2 MMOL/L (ref 3.5–5.1)
SODIUM SERPL-SCNC: 137 MMOL/L (ref 136–145)

## 2020-07-12 RX ADMIN — INSULIN LISPRO SCH UNIT: 100 INJECTION, SOLUTION INTRAVENOUS; SUBCUTANEOUS at 16:30

## 2020-07-12 RX ADMIN — INSULIN LISPRO SCH UNIT: 100 INJECTION, SOLUTION INTRAVENOUS; SUBCUTANEOUS at 23:01

## 2020-07-12 RX ADMIN — MEGESTROL ACETATE SCH MG: 40 TABLET ORAL at 17:00

## 2020-07-12 RX ADMIN — CEFOXITIN SCH MLS/HR: 1 INJECTION, POWDER, FOR SOLUTION INTRAVENOUS at 23:00

## 2020-07-12 RX ADMIN — SODIUM CHLORIDE SCH MLS/HR: 9 INJECTION, SOLUTION INTRAVENOUS at 12:00

## 2020-07-12 RX ADMIN — METOCLOPRAMIDE SCH MG: 10 TABLET ORAL at 11:30

## 2020-07-12 RX ADMIN — MEGESTROL ACETATE SCH MG: 40 TABLET ORAL at 09:00

## 2020-07-12 RX ADMIN — SODIUM CHLORIDE SCH MG: 900 INJECTION INTRAVENOUS at 09:00

## 2020-07-12 RX ADMIN — METOCLOPRAMIDE SCH MG: 10 TABLET ORAL at 16:30

## 2020-07-12 RX ADMIN — METOCLOPRAMIDE SCH MG: 10 TABLET ORAL at 22:30

## 2020-07-12 RX ADMIN — Medication SCH EA: at 17:00

## 2020-07-12 RX ADMIN — Medication SCH MG: at 09:00

## 2020-07-12 RX ADMIN — MIRTAZAPINE SCH MG: 15 TABLET, FILM COATED ORAL at 22:30

## 2020-07-12 RX ADMIN — METOCLOPRAMIDE SCH MG: 10 TABLET ORAL at 07:30

## 2020-07-12 RX ADMIN — INSULIN LISPRO SCH UNIT: 100 INJECTION, SOLUTION INTRAVENOUS; SUBCUTANEOUS at 11:30

## 2020-07-12 RX ADMIN — TAMSULOSIN HYDROCHLORIDE SCH MG: 0.4 CAPSULE ORAL at 17:00

## 2020-07-12 RX ADMIN — Medication SCH EA: at 09:00

## 2020-07-12 RX ADMIN — INSULIN LISPRO SCH UNIT: 100 INJECTION, SOLUTION INTRAVENOUS; SUBCUTANEOUS at 07:30

## 2020-07-12 RX ADMIN — SODIUM CHLORIDE SCH MG: 900 INJECTION INTRAVENOUS at 17:00

## 2020-07-13 VITALS — SYSTOLIC BLOOD PRESSURE: 110 MMHG | DIASTOLIC BLOOD PRESSURE: 59 MMHG

## 2020-07-13 VITALS — DIASTOLIC BLOOD PRESSURE: 55 MMHG | SYSTOLIC BLOOD PRESSURE: 105 MMHG

## 2020-07-13 VITALS — DIASTOLIC BLOOD PRESSURE: 61 MMHG | SYSTOLIC BLOOD PRESSURE: 111 MMHG

## 2020-07-13 VITALS — DIASTOLIC BLOOD PRESSURE: 60 MMHG | SYSTOLIC BLOOD PRESSURE: 101 MMHG

## 2020-07-13 VITALS — DIASTOLIC BLOOD PRESSURE: 62 MMHG | SYSTOLIC BLOOD PRESSURE: 103 MMHG

## 2020-07-13 VITALS — SYSTOLIC BLOOD PRESSURE: 106 MMHG | DIASTOLIC BLOOD PRESSURE: 58 MMHG

## 2020-07-13 VITALS — SYSTOLIC BLOOD PRESSURE: 105 MMHG | DIASTOLIC BLOOD PRESSURE: 60 MMHG

## 2020-07-13 VITALS — SYSTOLIC BLOOD PRESSURE: 98 MMHG | DIASTOLIC BLOOD PRESSURE: 53 MMHG

## 2020-07-13 VITALS — SYSTOLIC BLOOD PRESSURE: 115 MMHG | DIASTOLIC BLOOD PRESSURE: 59 MMHG

## 2020-07-13 VITALS — SYSTOLIC BLOOD PRESSURE: 115 MMHG | DIASTOLIC BLOOD PRESSURE: 64 MMHG

## 2020-07-13 VITALS — SYSTOLIC BLOOD PRESSURE: 124 MMHG | DIASTOLIC BLOOD PRESSURE: 52 MMHG

## 2020-07-13 VITALS — DIASTOLIC BLOOD PRESSURE: 60 MMHG | SYSTOLIC BLOOD PRESSURE: 119 MMHG

## 2020-07-13 VITALS — DIASTOLIC BLOOD PRESSURE: 53 MMHG | SYSTOLIC BLOOD PRESSURE: 106 MMHG

## 2020-07-13 VITALS — SYSTOLIC BLOOD PRESSURE: 113 MMHG | DIASTOLIC BLOOD PRESSURE: 66 MMHG

## 2020-07-13 PROCEDURE — 0D160ZA BYPASS STOMACH TO JEJUNUM, OPEN APPROACH: ICD-10-PCS | Performed by: SURGERY

## 2020-07-13 PROCEDURE — 0DB90ZZ EXCISION OF DUODENUM, OPEN APPROACH: ICD-10-PCS | Performed by: SURGERY

## 2020-07-13 PROCEDURE — 0DB60ZZ EXCISION OF STOMACH, OPEN APPROACH: ICD-10-PCS | Performed by: SURGERY

## 2020-07-13 RX ADMIN — MEGESTROL ACETATE SCH MG: 40 TABLET ORAL at 09:00

## 2020-07-13 RX ADMIN — INSULIN LISPRO SCH UNIT: 100 INJECTION, SOLUTION INTRAVENOUS; SUBCUTANEOUS at 07:30

## 2020-07-13 RX ADMIN — SODIUM CHLORIDE SCH MLS/HR: 9 INJECTION, SOLUTION INTRAVENOUS at 20:21

## 2020-07-13 RX ADMIN — HYDROMORPHONE HYDROCHLORIDE PRN MG: 1 INJECTION, SOLUTION INTRAMUSCULAR; INTRAVENOUS; SUBCUTANEOUS at 17:00

## 2020-07-13 RX ADMIN — SODIUM CHLORIDE SCH ML: 900 IRRIGANT IRRIGATION at 17:42

## 2020-07-13 RX ADMIN — SODIUM CHLORIDE SCH ML: 900 IRRIGANT IRRIGATION at 22:15

## 2020-07-13 RX ADMIN — SODIUM CHLORIDE SCH MG: 900 INJECTION INTRAVENOUS at 09:00

## 2020-07-13 RX ADMIN — HYDROMORPHONE HYDROCHLORIDE PRN MG: 1 INJECTION, SOLUTION INTRAMUSCULAR; INTRAVENOUS; SUBCUTANEOUS at 20:31

## 2020-07-13 RX ADMIN — METOCLOPRAMIDE SCH MG: 10 TABLET ORAL at 07:30

## 2020-07-13 RX ADMIN — SODIUM CHLORIDE SCH MG: 900 INJECTION INTRAVENOUS at 20:21

## 2020-07-13 RX ADMIN — SODIUM CHLORIDE SCH MLS/HR: 9 INJECTION, SOLUTION INTRAVENOUS at 12:00

## 2020-07-13 RX ADMIN — SODIUM CHLORIDE SCH ML: 900 IRRIGANT IRRIGATION at 20:21

## 2020-07-13 NOTE — OPERATIVE REPORT
DATE OF PROCEDURE:  07/13/2020

 

SURGEON:  Martir Dumont MD

 

PREOPERATIVE DIAGNOSIS:  Gastric cancer.

 

POSTOPERATIVE DIAGNOSIS:  Gastric cancer.

 

OPERATIONS PERFORMED:  Exploratory laparotomy, near-total gastrectomy with Lilliana-en-Y

reconstruction. 

 

ASSISTANTS:  

1. Kory Dumont MD.

2. GLORIA Abbasi.

 

ANESTHESIA:  General.

 

COMPLICATIONS:  None.

 

ESTIMATED BLOOD LOSS:  150 mL.

 

DESCRIPTION OF PROCEDURE:  With the patient lying in bed in the supine position under

good general endotracheal anesthesia, the abdomen was prepped with Betadine solution and

draped in the usual manner.  An upper midline incision was made, it was carried down

through the subcutaneous tissue and through the midline fascia.  The peritoneum was

opened and the abdomen was entered.  Upon entering the abdominal cavity, immediately a

distended stomach, full of some food contents was encountered.  There was also a large

tumor in the antral part of the stomach, that was hard and it extended all the way up to

about MCFP up the stomach.  There was some obvious adenopathy in the left gastric

distribution and there was some tumor, that appeared to extend through the perigastric

subcutaneous tissue.  The liver was clean of any metastatic disease.  The rest of the

abdominal cavity showed some very minimal nodularity in the pelvic brim.  Otherwise,

there was a small amount of ascites present.  All of the small bowel appeared to be

within normal limits.  The omentum was also clean of any metastatic disease.  We decided

to go ahead and proceed with a gastrectomy as the patient was impending obstruction and

had having able to the eat for a the time, although this is more of a palliative

operation.  At this point, omentum was then detached from the transverse colon.  The

lesser sac was entered and the stomach was mobilized superiorly.  The short gastrics

were then taken down using the EnSeal device.  The stomach was thus freed up all the way

on the lesser curvature side.  At this point, the lesser curvature was then freed up,

the left gastric was identified and ligated, there was some adenopathy in this area;

once this was done, ligated with 0 silk.  The stomach was then divided with the stapler,

leaving roughly remnant of may be about 100 mL and the stomach was thus divided.  The

stomach was then  from the pancreas.  The duodenum was then circumferentially

dissected distal to the tumor and the duodenum was divided with a TA-60 stapler and the

specimen was sent for pathological examination.  After this was done, the whole area was

irrigated.  Hemostasis was ascertained.  The duodenal stump was then oversewn further

with 2-0 silk suture.  Similarly, the staple line of the stomach was oversewn with 2-0

silk suture.  The ligament of Treitz was then identified and just distal to the ligament

of Treitz.  The small bowel was divided with an application of ANIA-75 stapler.  The

efferent loop was then brought up to the upper abdomen in an antecolic fashion without

any tension whatsoever and the gastrojejunal anastomosis was then performed with another

application of ANIA-75 stapler.  The remaining opening was closed with a TA-60 stapler

and the anastomosis was reinforced with 2-0 silk.  60 cm distal to the gastrojejunal

anastomosis is jejunojejunal anastomosis of the Lilliana-en-Y was then performed

side-to-side with another application of the TA-60 stapler with the remaining opening

being closed with a TA-60 stapler.  Gloves and instruments were then changed.  The last

anastomosis was reinforced with 3-0 silk.  The abdomen was then copiously irrigated and

perfect hemostasis was ascertained.  The NG tube was then left in place in the gastric

pouch, and the abdomen was then closed in layers.  The peritoneum was closed with a

running suture of #1 Vicryl.  The midline fascia was closed with a running suture of #1

Vicryl and the skin was closed with clips.  A dressing was applied.  The sponge, lap,

and needle count were correct.  The patient tolerated the procedure well and returned to

the recovery room in stable condition. 

 

 

 

 

______________________________

MD ROHINI Nichols/NORMA

D:  07/13/2020 14:24:04

T:  07/13/2020 18:27:27

Job #:  [id number]

## 2020-07-14 VITALS — SYSTOLIC BLOOD PRESSURE: 134 MMHG | DIASTOLIC BLOOD PRESSURE: 71 MMHG

## 2020-07-14 VITALS — SYSTOLIC BLOOD PRESSURE: 121 MMHG | DIASTOLIC BLOOD PRESSURE: 61 MMHG

## 2020-07-14 VITALS — SYSTOLIC BLOOD PRESSURE: 129 MMHG | DIASTOLIC BLOOD PRESSURE: 64 MMHG

## 2020-07-14 VITALS — DIASTOLIC BLOOD PRESSURE: 69 MMHG | SYSTOLIC BLOOD PRESSURE: 129 MMHG

## 2020-07-14 VITALS — SYSTOLIC BLOOD PRESSURE: 125 MMHG | DIASTOLIC BLOOD PRESSURE: 63 MMHG

## 2020-07-14 VITALS — DIASTOLIC BLOOD PRESSURE: 64 MMHG | SYSTOLIC BLOOD PRESSURE: 127 MMHG

## 2020-07-14 VITALS — SYSTOLIC BLOOD PRESSURE: 132 MMHG | DIASTOLIC BLOOD PRESSURE: 67 MMHG

## 2020-07-14 VITALS — DIASTOLIC BLOOD PRESSURE: 65 MMHG | SYSTOLIC BLOOD PRESSURE: 133 MMHG

## 2020-07-14 VITALS — DIASTOLIC BLOOD PRESSURE: 57 MMHG | SYSTOLIC BLOOD PRESSURE: 112 MMHG

## 2020-07-14 VITALS — SYSTOLIC BLOOD PRESSURE: 134 MMHG | DIASTOLIC BLOOD PRESSURE: 66 MMHG

## 2020-07-14 VITALS — SYSTOLIC BLOOD PRESSURE: 122 MMHG | DIASTOLIC BLOOD PRESSURE: 61 MMHG

## 2020-07-14 VITALS — SYSTOLIC BLOOD PRESSURE: 110 MMHG | DIASTOLIC BLOOD PRESSURE: 60 MMHG

## 2020-07-14 VITALS — DIASTOLIC BLOOD PRESSURE: 64 MMHG | SYSTOLIC BLOOD PRESSURE: 123 MMHG

## 2020-07-14 VITALS — SYSTOLIC BLOOD PRESSURE: 119 MMHG | DIASTOLIC BLOOD PRESSURE: 64 MMHG

## 2020-07-14 VITALS — SYSTOLIC BLOOD PRESSURE: 121 MMHG | DIASTOLIC BLOOD PRESSURE: 58 MMHG

## 2020-07-14 LAB
ANION GAP SERPL CALC-SCNC: 13.3 MMOL/L (ref 8–16)
BASOPHILS # BLD AUTO: 0 10*3/UL (ref 0–0.1)
BASOPHILS NFR BLD AUTO: 0.2 % (ref 0–1)
BUN SERPL-MCNC: 17 MG/DL (ref 7–26)
BUN/CREAT SERPL: 20 (ref 6–25)
CALCIUM SERPL-MCNC: 7.2 MG/DL (ref 8.4–10.2)
CHLORIDE SERPL-SCNC: 113 MMOL/L (ref 98–107)
CO2 SERPL-SCNC: 19 MMOL/L (ref 22–29)
DEPRECATED NEUTROPHILS # BLD AUTO: 10 10*3/UL (ref 2.1–6.9)
EGFRCR SERPLBLD CKD-EPI 2021: > 60 ML/MIN (ref 60–?)
EOSINOPHIL # BLD AUTO: 0 10*3/UL (ref 0–0.4)
EOSINOPHIL NFR BLD AUTO: 0 % (ref 0–6)
ERYTHROCYTE [DISTWIDTH] IN CORD BLOOD: 16.8 % (ref 11.7–14.4)
GLUCOSE SERPLBLD-MCNC: 223 MG/DL (ref 74–118)
HCT VFR BLD AUTO: 30.3 % (ref 38.2–49.6)
HGB BLD-MCNC: 8.8 G/DL (ref 14–18)
LYMPHOCYTES # BLD: 0.3 10*3/UL (ref 1–3.2)
LYMPHOCYTES NFR BLD AUTO: 3 % (ref 18–39.1)
MCH RBC QN AUTO: 29.3 PG (ref 28–32)
MCHC RBC AUTO-ENTMCNC: 29 G/DL (ref 31–35)
MCV RBC AUTO: 101 FL (ref 81–99)
MONOCYTES # BLD AUTO: 0.7 10*3/UL (ref 0.2–0.8)
MONOCYTES NFR BLD AUTO: 6.4 % (ref 4.4–11.3)
NEUTS SEG NFR BLD AUTO: 89.9 % (ref 38.7–80)
PLATELET # BLD AUTO: 204 X10E3/UL (ref 140–360)
POTASSIUM SERPL-SCNC: 5.3 MMOL/L (ref 3.5–5.1)
RBC # BLD AUTO: 3 X10E6/UL (ref 4.3–5.7)
SODIUM SERPL-SCNC: 140 MMOL/L (ref 136–145)

## 2020-07-14 RX ADMIN — SODIUM CHLORIDE SCH MLS/HR: 9 INJECTION, SOLUTION INTRAVENOUS at 12:00

## 2020-07-14 RX ADMIN — HYDROMORPHONE HYDROCHLORIDE PRN MG: 1 INJECTION, SOLUTION INTRAMUSCULAR; INTRAVENOUS; SUBCUTANEOUS at 11:30

## 2020-07-14 RX ADMIN — SODIUM CHLORIDE SCH ML: 900 IRRIGANT IRRIGATION at 03:29

## 2020-07-14 RX ADMIN — SODIUM CHLORIDE SCH ML: 900 IRRIGANT IRRIGATION at 14:33

## 2020-07-14 RX ADMIN — HYDROMORPHONE HYDROCHLORIDE PRN MG: 1 INJECTION, SOLUTION INTRAMUSCULAR; INTRAVENOUS; SUBCUTANEOUS at 06:15

## 2020-07-14 RX ADMIN — SODIUM CHLORIDE SCH ML: 900 IRRIGANT IRRIGATION at 17:09

## 2020-07-14 RX ADMIN — SODIUM CHLORIDE SCH MG: 900 INJECTION INTRAVENOUS at 10:54

## 2020-07-14 RX ADMIN — SODIUM CHLORIDE SCH MLS/HR: 9 INJECTION, SOLUTION INTRAVENOUS at 07:04

## 2020-07-14 RX ADMIN — SODIUM CHLORIDE SCH ML: 900 IRRIGANT IRRIGATION at 22:15

## 2020-07-14 RX ADMIN — SODIUM CHLORIDE SCH ML: 900 IRRIGANT IRRIGATION at 06:34

## 2020-07-14 RX ADMIN — SODIUM CHLORIDE SCH MG: 900 INJECTION INTRAVENOUS at 18:00

## 2020-07-14 RX ADMIN — HYDROMORPHONE HYDROCHLORIDE PRN MG: 1 INJECTION, SOLUTION INTRAMUSCULAR; INTRAVENOUS; SUBCUTANEOUS at 01:19

## 2020-07-14 RX ADMIN — CEFOXITIN SCH MLS/HR: 1 INJECTION, POWDER, FOR SOLUTION INTRAVENOUS at 05:00

## 2020-07-14 RX ADMIN — HYDROMORPHONE HYDROCHLORIDE PRN MG: 1 INJECTION, SOLUTION INTRAMUSCULAR; INTRAVENOUS; SUBCUTANEOUS at 18:00

## 2020-07-14 RX ADMIN — SODIUM CHLORIDE SCH ML: 900 IRRIGANT IRRIGATION at 10:15

## 2020-07-14 NOTE — NUR
received transfer of care report from ICU RN. awaiting pt's arrival to room 115.

-------------------------------------------------------------------------------

Addendum: 07/14/20 at 1838 by Rustam Palacio RN

-------------------------------------------------------------------------------

states Dr. Dumont said to hold pt's dose of IV Venofer.

## 2020-07-14 NOTE — NUR
pt arrived to room 115. pt awake, alert, no signs of distress. informed by RN she just did 
fingerstick blood glucose check and his blood sugar was 169. states she also just 
administered pt's PM dose of Protonix and gave pt dose of Dilaudid.

## 2020-07-15 VITALS — DIASTOLIC BLOOD PRESSURE: 60 MMHG | SYSTOLIC BLOOD PRESSURE: 108 MMHG

## 2020-07-15 VITALS — SYSTOLIC BLOOD PRESSURE: 128 MMHG | DIASTOLIC BLOOD PRESSURE: 56 MMHG

## 2020-07-15 VITALS — SYSTOLIC BLOOD PRESSURE: 125 MMHG | DIASTOLIC BLOOD PRESSURE: 64 MMHG

## 2020-07-15 VITALS — DIASTOLIC BLOOD PRESSURE: 70 MMHG | SYSTOLIC BLOOD PRESSURE: 136 MMHG

## 2020-07-15 VITALS — DIASTOLIC BLOOD PRESSURE: 68 MMHG | SYSTOLIC BLOOD PRESSURE: 133 MMHG

## 2020-07-15 LAB
ANION GAP SERPL CALC-SCNC: 7.9 MMOL/L (ref 8–16)
BASOPHILS # BLD AUTO: 0 10*3/UL (ref 0–0.1)
BASOPHILS NFR BLD AUTO: 0.2 % (ref 0–1)
BUN SERPL-MCNC: 15 MG/DL (ref 7–26)
BUN/CREAT SERPL: 26 (ref 6–25)
CALCIUM SERPL-MCNC: 7.3 MG/DL (ref 8.4–10.2)
CHLORIDE SERPL-SCNC: 113 MMOL/L (ref 98–107)
CO2 SERPL-SCNC: 21 MMOL/L (ref 22–29)
DEPRECATED NEUTROPHILS # BLD AUTO: 13.1 10*3/UL (ref 2.1–6.9)
EGFRCR SERPLBLD CKD-EPI 2021: > 60 ML/MIN (ref 60–?)
EOSINOPHIL # BLD AUTO: 0 10*3/UL (ref 0–0.4)
EOSINOPHIL NFR BLD AUTO: 0.1 % (ref 0–6)
ERYTHROCYTE [DISTWIDTH] IN CORD BLOOD: 16.6 % (ref 11.7–14.4)
GLUCOSE SERPLBLD-MCNC: 109 MG/DL (ref 74–118)
HCT VFR BLD AUTO: 28.1 % (ref 38.2–49.6)
HGB BLD-MCNC: 8.6 G/DL (ref 14–18)
LYMPHOCYTES # BLD: 0.4 10*3/UL (ref 1–3.2)
LYMPHOCYTES NFR BLD AUTO: 2.7 % (ref 18–39.1)
MCH RBC QN AUTO: 29.1 PG (ref 28–32)
MCHC RBC AUTO-ENTMCNC: 30.6 G/DL (ref 31–35)
MCV RBC AUTO: 94.9 FL (ref 81–99)
MONOCYTES # BLD AUTO: 1.2 10*3/UL (ref 0.2–0.8)
MONOCYTES NFR BLD AUTO: 7.9 % (ref 4.4–11.3)
NEUTS SEG NFR BLD AUTO: 88.6 % (ref 38.7–80)
PLATELET # BLD AUTO: 226 X10E3/UL (ref 140–360)
POTASSIUM SERPL-SCNC: 3.9 MMOL/L (ref 3.5–5.1)
RBC # BLD AUTO: 2.96 X10E6/UL (ref 4.3–5.7)
SODIUM SERPL-SCNC: 138 MMOL/L (ref 136–145)

## 2020-07-15 RX ADMIN — SODIUM CHLORIDE SCH ML: 900 IRRIGANT IRRIGATION at 09:31

## 2020-07-15 RX ADMIN — SODIUM CHLORIDE SCH MG: 900 INJECTION INTRAVENOUS at 09:27

## 2020-07-15 RX ADMIN — DEXTROSE AND SODIUM CHLORIDE SCH MLS/HR: 5; 900 INJECTION, SOLUTION INTRAVENOUS at 09:24

## 2020-07-15 RX ADMIN — DEXTROSE AND SODIUM CHLORIDE SCH MLS/HR: 5; 900 INJECTION, SOLUTION INTRAVENOUS at 18:38

## 2020-07-15 RX ADMIN — HYDROMORPHONE HYDROCHLORIDE PRN MG: 1 INJECTION, SOLUTION INTRAMUSCULAR; INTRAVENOUS; SUBCUTANEOUS at 00:49

## 2020-07-15 RX ADMIN — SODIUM CHLORIDE SCH ML: 900 IRRIGANT IRRIGATION at 17:14

## 2020-07-15 RX ADMIN — SODIUM CHLORIDE SCH ML: 900 IRRIGANT IRRIGATION at 14:15

## 2020-07-15 RX ADMIN — SODIUM CHLORIDE SCH ML: 900 IRRIGANT IRRIGATION at 05:56

## 2020-07-15 RX ADMIN — SODIUM CHLORIDE SCH ML: 900 IRRIGANT IRRIGATION at 02:05

## 2020-07-15 RX ADMIN — SODIUM CHLORIDE PRN MG: 900 INJECTION INTRAVENOUS at 12:37

## 2020-07-15 RX ADMIN — HYDROMORPHONE HYDROCHLORIDE PRN MG: 1 INJECTION, SOLUTION INTRAMUSCULAR; INTRAVENOUS; SUBCUTANEOUS at 12:37

## 2020-07-15 RX ADMIN — SODIUM CHLORIDE PRN MG: 900 INJECTION INTRAVENOUS at 00:49

## 2020-07-15 RX ADMIN — SODIUM CHLORIDE SCH MLS/HR: 9 INJECTION, SOLUTION INTRAVENOUS at 04:56

## 2020-07-15 NOTE — NUR
GAVE BEDSIDE SHIFT REPORT TO ONCOMING NURSE. CALL LIGHT WITHIN REACH. PATIENT IN BED. HOURLY 
ROUNDING PERFORMED.

## 2020-07-15 NOTE — NUR
Nutrition Intervention Note



RD Recommendation(s) for Physician: 

-Recommend advancing to GI soft diet when medically appropriate

-Recommend Glucerna with meals for added nutrition when diet is advanced

-If diet is unable to be advanced for > 5 days of being NPO, consider alternative means of 
nutrition 

The patient meets criteria for unspecified SEVERE protein-calorie malnutrition. 



Plan of Care: RD following, monitoring for tolerance and adequacy



Nutrition reason for involvement: follow up



RD Assessment

7/15: Follow up. Pt had an exploratory laparotomy with subtotal gastrectomy on 7/13 per 
chart. Pt is currently NPO. It is recorded that pt consumed % of meals from 7/11-7/12 
prior to surgery. Recommend advancing diet when medically appropriate. Will continue to 
monitor.



(7/10/20) Pt is an 80 year old male admitted with anemia, GI bleed, and weight loss. Pt had 
an EGD with biopsy today per chart which showed a large amount of retained food and the 
antrum was ulcerated and friable. It is recorded that pt consumed 50-75% of meals today, but 
prior to admission, pt reports not eating much since June 27th. Pt also reports weight loss 
and he had weighed 125 lbs in April. Pt currently weighs 106 lbs. This would be 15% weight 
loss in the past 3 months which is considered to be significant weight loss. Pt is currently 
on an appetite stimulant. Recommend Glucerna with meals for added nutrition. Will continue 
to monitor.



Principal Problems/Diagnoses:  anemia, GI bleed, weight loss



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 
on last admission. Progressive weight loss, Diabetes type 2, hypertension, and enlarged 
prostate.



GI: soft, non-tender round, abdomen, last recorded BM 7/12



Skin: intact



Labs: 7/15: Na 138, Cr 0.58, BUN 15, Glu 109, Ca 7.3

(7/10) Na 137, K 3.8, Cr 0.76, Glu 89, Ca 8.0



Meds: zofran, protonix, NaCl



Ht: 62 inches          

Wt: 106 lbs          

BMI: 19.4 kg/m2          

IBW: 118 lbs     





Malnutrition Evaluation (7/10/20)

The patient meets criteria for unspecified SEVERE protein-calorie malnutrition. 



Energy intake:

<50% of estimated energy requirements for >5 days



Weight loss:

>7.5% in 3 months (Acute)



Fat loss: unable to evaluate

Muscle loss: unable to evaluate



Supporting Evidence:

Fluid accumulation: no edema per MD note

Functional Status: unable to assess



Nutrition Prescription (Diet Order): NPO



Estimated Nutritional Needs:

7715-0437 calories/day (25-35 kcal/kg CBW)

48-72 g protein/day (1-1.5 g pro/kg CBW)



Diet Adequacy: Pt is NPO



Tolerance: pt is NPO 





Diet Education Needs Assessment: Diet education not indicated



Nutrition Care Level: moderate



Nutrition Diagnosis: Severe malnutrition related to h/o inadequate intake as evidenced by pt 
meeting <50% of estimated energy needs for > 5 days and >7.5% weight loss in 3 months.





Goal: Patient will meet % of estimated needs by follow up 



Progress: goal not met, pt is NPO



Interventions:

-fiber-modified diet, Commercial beverage



Monitoring/Evaluation:

-Total energy intake, Total protein intake, Liquid supplement, Weight change





Signed: Ce Ba RD, LD

## 2020-07-16 VITALS — SYSTOLIC BLOOD PRESSURE: 118 MMHG | DIASTOLIC BLOOD PRESSURE: 62 MMHG

## 2020-07-16 VITALS — SYSTOLIC BLOOD PRESSURE: 119 MMHG | DIASTOLIC BLOOD PRESSURE: 60 MMHG

## 2020-07-16 VITALS — SYSTOLIC BLOOD PRESSURE: 135 MMHG | DIASTOLIC BLOOD PRESSURE: 67 MMHG

## 2020-07-16 VITALS — DIASTOLIC BLOOD PRESSURE: 61 MMHG | SYSTOLIC BLOOD PRESSURE: 115 MMHG

## 2020-07-16 VITALS — DIASTOLIC BLOOD PRESSURE: 58 MMHG | SYSTOLIC BLOOD PRESSURE: 120 MMHG

## 2020-07-16 VITALS — DIASTOLIC BLOOD PRESSURE: 55 MMHG | SYSTOLIC BLOOD PRESSURE: 114 MMHG

## 2020-07-16 LAB
ANION GAP SERPL CALC-SCNC: 7.3 MMOL/L (ref 8–16)
BASOPHILS # BLD AUTO: 0 10*3/UL (ref 0–0.1)
BASOPHILS NFR BLD AUTO: 0.3 % (ref 0–1)
BUN SERPL-MCNC: 13 MG/DL (ref 7–26)
BUN/CREAT SERPL: 21 (ref 6–25)
CALCIUM SERPL-MCNC: 7 MG/DL (ref 8.4–10.2)
CHLORIDE SERPL-SCNC: 112 MMOL/L (ref 98–107)
CO2 SERPL-SCNC: 23 MMOL/L (ref 22–29)
DEPRECATED NEUTROPHILS # BLD AUTO: 6.9 10*3/UL (ref 2.1–6.9)
EGFRCR SERPLBLD CKD-EPI 2021: > 60 ML/MIN (ref 60–?)
EOSINOPHIL # BLD AUTO: 0 10*3/UL (ref 0–0.4)
EOSINOPHIL NFR BLD AUTO: 0 % (ref 0–6)
ERYTHROCYTE [DISTWIDTH] IN CORD BLOOD: 16.5 % (ref 11.7–14.4)
GLUCOSE SERPLBLD-MCNC: 164 MG/DL (ref 74–118)
HCT VFR BLD AUTO: 21.9 % (ref 38.2–49.6)
HGB BLD-MCNC: 7.1 G/DL (ref 14–18)
LYMPHOCYTES # BLD: 0.3 10*3/UL (ref 1–3.2)
LYMPHOCYTES NFR BLD AUTO: 3.8 % (ref 18–39.1)
MCH RBC QN AUTO: 31.1 PG (ref 28–32)
MCHC RBC AUTO-ENTMCNC: 32.4 G/DL (ref 31–35)
MCV RBC AUTO: 96.1 FL (ref 81–99)
MONOCYTES # BLD AUTO: 0.4 10*3/UL (ref 0.2–0.8)
MONOCYTES NFR BLD AUTO: 5.3 % (ref 4.4–11.3)
NEUTS SEG NFR BLD AUTO: 90.1 % (ref 38.7–80)
PLATELET # BLD AUTO: 205 X10E3/UL (ref 140–360)
POTASSIUM SERPL-SCNC: 3.3 MMOL/L (ref 3.5–5.1)
RBC # BLD AUTO: 2.28 X10E6/UL (ref 4.3–5.7)
SODIUM SERPL-SCNC: 139 MMOL/L (ref 136–145)

## 2020-07-16 RX ADMIN — BENZONATATE SCH MG: 100 CAPSULE ORAL at 10:00

## 2020-07-16 RX ADMIN — SODIUM CHLORIDE PRN MG: 900 INJECTION INTRAVENOUS at 04:57

## 2020-07-16 RX ADMIN — DEXTROSE AND SODIUM CHLORIDE SCH MLS/HR: 5; 900 INJECTION, SOLUTION INTRAVENOUS at 23:49

## 2020-07-16 RX ADMIN — BENZONATATE SCH MG: 100 CAPSULE ORAL at 20:38

## 2020-07-16 RX ADMIN — SODIUM CHLORIDE SCH MG: 900 INJECTION INTRAVENOUS at 04:57

## 2020-07-16 RX ADMIN — INSULIN LISPRO SCH UNIT: 100 INJECTION, SOLUTION INTRAVENOUS; SUBCUTANEOUS at 20:34

## 2020-07-16 RX ADMIN — HYDROMORPHONE HYDROCHLORIDE PRN MG: 1 INJECTION, SOLUTION INTRAMUSCULAR; INTRAVENOUS; SUBCUTANEOUS at 04:57

## 2020-07-16 RX ADMIN — INSULIN LISPRO SCH UNIT: 100 INJECTION, SOLUTION INTRAVENOUS; SUBCUTANEOUS at 17:59

## 2020-07-16 RX ADMIN — BENZONATATE SCH MG: 100 CAPSULE ORAL at 16:18

## 2020-07-16 RX ADMIN — DEXTROSE AND SODIUM CHLORIDE SCH MLS/HR: 5; 900 INJECTION, SOLUTION INTRAVENOUS at 14:15

## 2020-07-16 RX ADMIN — DEXTROSE AND SODIUM CHLORIDE SCH MLS/HR: 5; 900 INJECTION, SOLUTION INTRAVENOUS at 04:57

## 2020-07-17 VITALS — SYSTOLIC BLOOD PRESSURE: 123 MMHG | DIASTOLIC BLOOD PRESSURE: 64 MMHG

## 2020-07-17 VITALS — DIASTOLIC BLOOD PRESSURE: 47 MMHG | SYSTOLIC BLOOD PRESSURE: 102 MMHG

## 2020-07-17 VITALS — DIASTOLIC BLOOD PRESSURE: 64 MMHG | SYSTOLIC BLOOD PRESSURE: 123 MMHG

## 2020-07-17 VITALS — DIASTOLIC BLOOD PRESSURE: 63 MMHG | SYSTOLIC BLOOD PRESSURE: 114 MMHG

## 2020-07-17 VITALS — SYSTOLIC BLOOD PRESSURE: 117 MMHG | DIASTOLIC BLOOD PRESSURE: 56 MMHG

## 2020-07-17 VITALS — DIASTOLIC BLOOD PRESSURE: 78 MMHG | SYSTOLIC BLOOD PRESSURE: 108 MMHG

## 2020-07-17 VITALS — DIASTOLIC BLOOD PRESSURE: 51 MMHG | SYSTOLIC BLOOD PRESSURE: 103 MMHG

## 2020-07-17 LAB
ANION GAP SERPL CALC-SCNC: 8.4 MMOL/L (ref 8–16)
ANISOCYTOSIS BLD QL SMEAR: SLIGHT
BASOPHILS # BLD AUTO: 0 10*3/UL (ref 0–0.1)
BASOPHILS NFR BLD AUTO: 0.1 % (ref 0–1)
BUN SERPL-MCNC: 14 MG/DL (ref 7–26)
BUN/CREAT SERPL: 22 (ref 6–25)
CALCIUM SERPL-MCNC: 7 MG/DL (ref 8.4–10.2)
CHLORIDE SERPL-SCNC: 111 MMOL/L (ref 98–107)
CO2 SERPL-SCNC: 23 MMOL/L (ref 22–29)
DEPRECATED NEUTROPHILS # BLD AUTO: 6.5 10*3/UL (ref 2.1–6.9)
EGFRCR SERPLBLD CKD-EPI 2021: > 60 ML/MIN (ref 60–?)
EOSINOPHIL # BLD AUTO: 0 10*3/UL (ref 0–0.4)
EOSINOPHIL NFR BLD AUTO: 0 % (ref 0–6)
ERYTHROCYTE [DISTWIDTH] IN CORD BLOOD: 16.6 % (ref 11.7–14.4)
GLUCOSE SERPLBLD-MCNC: 103 MG/DL (ref 74–118)
HCT VFR BLD AUTO: 29.7 % (ref 38.2–49.6)
HGB BLD-MCNC: 9.7 G/DL (ref 14–18)
LYMPHOCYTES # BLD: 0.2 10*3/UL (ref 1–3.2)
LYMPHOCYTES NFR BLD AUTO: 3.3 % (ref 18–39.1)
LYMPHOCYTES NFR BLD MANUAL: 5 % (ref 19–48)
MCH RBC QN AUTO: 28.3 PG (ref 28–32)
MCHC RBC AUTO-ENTMCNC: 32.7 G/DL (ref 31–35)
MCV RBC AUTO: 86.6 FL (ref 81–99)
MONOCYTES # BLD AUTO: 0.2 10*3/UL (ref 0.2–0.8)
MONOCYTES NFR BLD AUTO: 3 % (ref 4.4–11.3)
MONOCYTES NFR BLD MANUAL: 2 % (ref 3.4–9)
MYELOCYTES NFR BLD MANUAL: 2 % (ref 0–0)
NEUTS BAND NFR BLD MANUAL: 13 %
NEUTS SEG NFR BLD AUTO: 93.3 % (ref 38.7–80)
NEUTS SEG NFR BLD MANUAL: 78 % (ref 40–74)
PLAT MORPH BLD: NORMAL
PLATELET # BLD AUTO: 157 X10E3/UL (ref 140–360)
PLATELET # BLD EST: ADEQUATE 10*3/UL
POTASSIUM SERPL-SCNC: 2.4 MMOL/L (ref 3.5–5.1)
RBC # BLD AUTO: 3.43 X10E6/UL (ref 4.3–5.7)
RBC MORPH BLD: NORMAL
SODIUM SERPL-SCNC: 140 MMOL/L (ref 136–145)

## 2020-07-17 RX ADMIN — CYANOCOBALAMIN SCH MCG: 1000 INJECTION, SOLUTION INTRAMUSCULAR at 09:00

## 2020-07-17 RX ADMIN — SODIUM CHLORIDE SCH MLS/HR: 9 INJECTION, SOLUTION INTRAVENOUS at 11:32

## 2020-07-17 RX ADMIN — INSULIN LISPRO SCH UNIT: 100 INJECTION, SOLUTION INTRAVENOUS; SUBCUTANEOUS at 07:30

## 2020-07-17 RX ADMIN — DEXTROSE, SODIUM CHLORIDE, AND POTASSIUM CHLORIDE SCH MLS/HR: 5; .9; .15 INJECTION INTRAVENOUS at 11:32

## 2020-07-17 RX ADMIN — BENZONATATE SCH MG: 100 CAPSULE ORAL at 15:42

## 2020-07-17 RX ADMIN — INSULIN LISPRO SCH UNIT: 100 INJECTION, SOLUTION INTRAVENOUS; SUBCUTANEOUS at 21:28

## 2020-07-17 RX ADMIN — BENZONATATE SCH MG: 100 CAPSULE ORAL at 21:14

## 2020-07-17 RX ADMIN — BENZONATATE SCH MG: 100 CAPSULE ORAL at 09:00

## 2020-07-17 RX ADMIN — INSULIN LISPRO SCH UNIT: 100 INJECTION, SOLUTION INTRAVENOUS; SUBCUTANEOUS at 11:30

## 2020-07-17 RX ADMIN — SODIUM CHLORIDE SCH MG: 900 INJECTION INTRAVENOUS at 05:19

## 2020-07-17 RX ADMIN — INSULIN LISPRO SCH UNIT: 100 INJECTION, SOLUTION INTRAVENOUS; SUBCUTANEOUS at 16:49

## 2020-07-18 VITALS — DIASTOLIC BLOOD PRESSURE: 91 MMHG | SYSTOLIC BLOOD PRESSURE: 138 MMHG

## 2020-07-18 VITALS — SYSTOLIC BLOOD PRESSURE: 127 MMHG | DIASTOLIC BLOOD PRESSURE: 71 MMHG

## 2020-07-18 VITALS — SYSTOLIC BLOOD PRESSURE: 125 MMHG | DIASTOLIC BLOOD PRESSURE: 64 MMHG

## 2020-07-18 VITALS — DIASTOLIC BLOOD PRESSURE: 71 MMHG | SYSTOLIC BLOOD PRESSURE: 127 MMHG

## 2020-07-18 VITALS — DIASTOLIC BLOOD PRESSURE: 57 MMHG | SYSTOLIC BLOOD PRESSURE: 123 MMHG

## 2020-07-18 VITALS — SYSTOLIC BLOOD PRESSURE: 128 MMHG | DIASTOLIC BLOOD PRESSURE: 81 MMHG

## 2020-07-18 VITALS — SYSTOLIC BLOOD PRESSURE: 127 MMHG | DIASTOLIC BLOOD PRESSURE: 70 MMHG

## 2020-07-18 VITALS — SYSTOLIC BLOOD PRESSURE: 109 MMHG | DIASTOLIC BLOOD PRESSURE: 63 MMHG

## 2020-07-18 LAB
ANION GAP SERPL CALC-SCNC: 9.8 MMOL/L (ref 8–16)
BASOPHILS # BLD AUTO: 0.1 10*3/UL (ref 0–0.1)
BASOPHILS NFR BLD AUTO: 1.2 % (ref 0–1)
BUN SERPL-MCNC: 13 MG/DL (ref 7–26)
BUN/CREAT SERPL: 22 (ref 6–25)
BURR CELLS BLD QL SMEAR: (no result)
CALCIUM SERPL-MCNC: 7.3 MG/DL (ref 8.4–10.2)
CHLORIDE SERPL-SCNC: 113 MMOL/L (ref 98–107)
CO2 SERPL-SCNC: 23 MMOL/L (ref 22–29)
DEPRECATED NEUTROPHILS # BLD AUTO: 5.2 10*3/UL (ref 2.1–6.9)
DEPRECATED PHOSPHATE SERPL-MCNC: 1.4 MG/DL (ref 2.3–4.7)
EGFRCR SERPLBLD CKD-EPI 2021: > 60 ML/MIN (ref 60–?)
EOSINOPHIL # BLD AUTO: 0 10*3/UL (ref 0–0.4)
EOSINOPHIL NFR BLD AUTO: 0 % (ref 0–6)
EOSINOPHIL NFR BLD MANUAL: 1 % (ref 0–7)
ERYTHROCYTE [DISTWIDTH] IN CORD BLOOD: 16.8 % (ref 11.7–14.4)
GLUCOSE SERPLBLD-MCNC: 71 MG/DL (ref 74–118)
HCT VFR BLD AUTO: 32.2 % (ref 38.2–49.6)
HGB BLD-MCNC: 10.5 G/DL (ref 14–18)
LYMPHOCYTES # BLD: 0.3 10*3/UL (ref 1–3.2)
LYMPHOCYTES NFR BLD AUTO: 4.4 % (ref 18–39.1)
LYMPHOCYTES NFR BLD MANUAL: 5 % (ref 19–48)
MAGNESIUM SERPL-MCNC: 1.8 MG/DL (ref 1.3–2.1)
MCH RBC QN AUTO: 28.9 PG (ref 28–32)
MCHC RBC AUTO-ENTMCNC: 32.6 G/DL (ref 31–35)
MCV RBC AUTO: 88.7 FL (ref 81–99)
MONOCYTES # BLD AUTO: 0.1 10*3/UL (ref 0.2–0.8)
MONOCYTES NFR BLD AUTO: 2.3 % (ref 4.4–11.3)
MONOCYTES NFR BLD MANUAL: 4 % (ref 3.4–9)
NEUTS BAND NFR BLD MANUAL: 15 %
NEUTS SEG NFR BLD AUTO: 91.7 % (ref 38.7–80)
NEUTS SEG NFR BLD MANUAL: 75 % (ref 40–74)
PLAT MORPH BLD: NORMAL
PLATELET # BLD AUTO: 145 X10E3/UL (ref 140–360)
PLATELET # BLD EST: ADEQUATE 10*3/UL
POTASSIUM SERPL-SCNC: 2.8 MMOL/L (ref 3.5–5.1)
RBC # BLD AUTO: 3.63 X10E6/UL (ref 4.3–5.7)
RBC MORPH BLD: (no result)
SODIUM SERPL-SCNC: 143 MMOL/L (ref 136–145)

## 2020-07-18 RX ADMIN — HYDROMORPHONE HYDROCHLORIDE PRN MG: 1 INJECTION, SOLUTION INTRAMUSCULAR; INTRAVENOUS; SUBCUTANEOUS at 19:57

## 2020-07-18 RX ADMIN — SODIUM CHLORIDE PRN MG: 900 INJECTION INTRAVENOUS at 19:56

## 2020-07-18 RX ADMIN — INSULIN LISPRO SCH UNIT: 100 INJECTION, SOLUTION INTRAVENOUS; SUBCUTANEOUS at 11:30

## 2020-07-18 RX ADMIN — DEXTROSE, SODIUM CHLORIDE, AND POTASSIUM CHLORIDE SCH MLS/HR: 5; .9; .15 INJECTION INTRAVENOUS at 06:06

## 2020-07-18 RX ADMIN — BENZONATATE SCH MG: 100 CAPSULE ORAL at 09:21

## 2020-07-18 RX ADMIN — BENZONATATE SCH MG: 100 CAPSULE ORAL at 18:20

## 2020-07-18 RX ADMIN — SODIUM CHLORIDE SCH MG: 900 INJECTION INTRAVENOUS at 06:06

## 2020-07-18 RX ADMIN — SODIUM CHLORIDE SCH MLS/HR: 9 INJECTION, SOLUTION INTRAVENOUS at 09:30

## 2020-07-18 RX ADMIN — INSULIN LISPRO SCH UNIT: 100 INJECTION, SOLUTION INTRAVENOUS; SUBCUTANEOUS at 18:22

## 2020-07-18 RX ADMIN — BENZONATATE SCH MG: 100 CAPSULE ORAL at 21:44

## 2020-07-18 RX ADMIN — INSULIN LISPRO SCH UNIT: 100 INJECTION, SOLUTION INTRAVENOUS; SUBCUTANEOUS at 21:45

## 2020-07-18 RX ADMIN — INSULIN LISPRO SCH UNIT: 100 INJECTION, SOLUTION INTRAVENOUS; SUBCUTANEOUS at 07:30

## 2020-07-18 RX ADMIN — SODIUM CHLORIDE SCH MG: 900 INJECTION INTRAVENOUS at 21:44

## 2020-07-18 RX ADMIN — CYANOCOBALAMIN SCH MCG: 1000 INJECTION, SOLUTION INTRAMUSCULAR at 09:21

## 2020-07-18 NOTE — NUR
pt o2 sat 89-91 on ra. pt has no sob or dostress. o2 3lnc placed. pt o2 sat increase to 96. 
dr mccann called to notify of change

## 2020-07-18 NOTE — NUR
RECEIVED REPORT FROM PREVIOUS NURSE. PATIENT IN BED. PATIENT COMPLAINING OF ABDOMINAL PAIN. 
CALL LIGHT WITHIN REACH. ROUNDING PERFORMED.

## 2020-07-18 NOTE — DIAGNOSTIC IMAGING REPORT
Examination: Single AP view of the chest.



COMPARISON: CT chest 7/10/2020



INDICATION: Shortness of breath, cough, history of abdominal surgery 7/13

    

IMPRESSION:

 

1.  Lines and Tubes: None

2.  Lungs are well-inflated. Opacification of the left hemithorax, which may

reflect pneumonia or layering effusion with atelectasis. Left lung is grossly

clear.

3.  Cardiomediastinal silhouette is normal.   Pulmonary vasculature is normal.

4.  No acute bony abnormalities.

5. Free air is noted under the hemidiaphragms, which is greater than expected

for a patient with history of abdominal surgery 5 days ago. Multiple mildly

dilated loops of air-filled small bowel are noted in the left abdomen.

Recommend CT abdomen and pelvis for further evaluation.

6. Findings discussed with Funmilayo, the patient's nurse as well as Dr. Luque

July 18, 2020 at 2205 hours



Signed by: Dr. Michelet Romero M.D. on 7/18/2020 10:06 PM

## 2020-07-18 NOTE — NUR
CALLED DR. DEMARCO BECAUSE WE BEEN TRYING TO GET THE IV FLUIDS HE ORDERED FOR 1315. PHARMACY IS 
NOT AVAILABLE TO BRING THE MEDICATION AND HOUSE SUPERVISOR WAS NOTIFIED. DR. DEMARCO SAID THE 
PATIENT SHOULD HAVE RECEIVED THIS EARLIER AND NEEDS THE MEDICATION BUT GIVE THE MEDICATION 
WE HAVE WITH POTASSIUM WHICH IS D5 20 KCL

## 2020-07-18 NOTE — NUR
DR. KETURAH LAWSON CALLED BECAUSE PATIENT HAS ABNORMAL ABDOMINAL X-RAY. DR. LAWSON ORDERED STAT CT 
ABDOMEN/PELVIS WITH CONTRAST WITH CUTS FROM MID CHEST DOWN. DR. KETURAH LAWSON SAID CONTACT DR. ROMINA CARDOSO AND DR. DEMARCO ABOUT THE RESULTS

## 2020-07-18 NOTE — NUR
CALLED AND TALKED TO DR. ROMINA CARDOSO ABOUT THE PATIENT'S CHEST X-RAY RESULTS. DR. ROMINA CARDOSO SAID TO MAKE THE PATIENT NPO, START ROCEPHIN 1 G NOW AND DAILY, AND CLINDAMYCIN 
900 MG IV Q8H. MENTIONED TO DR. ROMINA CARDOSO THAT STAT CT ABDOMEN/PELVIS WAS ORDERED AND DR. KETURAH LAWSON AND DR. DEMARCO WAS NOTIFIED OF THE RESULTS.

## 2020-07-18 NOTE — NUR
rounded with dr an, pt iv site puffy. pt states no pain. dr states he would iv 
moved.attempted to pt right ac, unsuccessful. notified charge nurse

## 2020-07-19 VITALS — DIASTOLIC BLOOD PRESSURE: 70 MMHG | SYSTOLIC BLOOD PRESSURE: 120 MMHG

## 2020-07-19 VITALS — SYSTOLIC BLOOD PRESSURE: 74 MMHG | DIASTOLIC BLOOD PRESSURE: 44 MMHG

## 2020-07-19 VITALS — DIASTOLIC BLOOD PRESSURE: 59 MMHG | SYSTOLIC BLOOD PRESSURE: 112 MMHG

## 2020-07-19 VITALS — SYSTOLIC BLOOD PRESSURE: 76 MMHG | DIASTOLIC BLOOD PRESSURE: 55 MMHG

## 2020-07-19 VITALS — SYSTOLIC BLOOD PRESSURE: 53 MMHG | DIASTOLIC BLOOD PRESSURE: 37 MMHG

## 2020-07-19 VITALS — SYSTOLIC BLOOD PRESSURE: 88 MMHG | DIASTOLIC BLOOD PRESSURE: 59 MMHG

## 2020-07-19 VITALS — DIASTOLIC BLOOD PRESSURE: 58 MMHG | SYSTOLIC BLOOD PRESSURE: 85 MMHG

## 2020-07-19 VITALS — SYSTOLIC BLOOD PRESSURE: 82 MMHG | DIASTOLIC BLOOD PRESSURE: 55 MMHG

## 2020-07-19 LAB
ACANTHOCYTES BLD QL SMEAR: (no result)
ACANTHOCYTES BLD QL SMEAR: (no result)
ALBUMIN SERPL-MCNC: 0.9 G/DL (ref 3.5–5)
ALBUMIN SERPL-MCNC: 1 G/DL (ref 3.5–5)
ALBUMIN/GLOB SERPL: 0.3 {RATIO} (ref 0.8–2)
ALBUMIN/GLOB SERPL: 0.4 {RATIO} (ref 0.8–2)
ALP SERPL-CCNC: 36 IU/L (ref 40–150)
ALP SERPL-CCNC: 44 IU/L (ref 40–150)
ALT SERPL-CCNC: 22 IU/L (ref 0–55)
ALT SERPL-CCNC: 31 IU/L (ref 0–55)
AMYLASE SERPL-CCNC: 21 U/L (ref 25–125)
ANION GAP SERPL CALC-SCNC: 11.1 MMOL/L (ref 8–16)
ANION GAP SERPL CALC-SCNC: 12.8 MMOL/L (ref 8–16)
ANION GAP SERPL CALC-SCNC: 12.8 MMOL/L (ref 8–16)
ANISOCYTOSIS BLD QL SMEAR: (no result)
ANISOCYTOSIS BLD QL SMEAR: SLIGHT
BASE EXCESS BLDA CALC-SCNC: -13 MMOL/L (ref -2–3)
BASE EXCESS BLDA CALC-SCNC: -15 MMOL/L (ref -2–3)
BASOPHILS # BLD AUTO: 0 10*3/UL (ref 0–0.1)
BASOPHILS # BLD AUTO: 0 10*3/UL (ref 0–0.1)
BASOPHILS NFR BLD AUTO: 0 % (ref 0–1)
BASOPHILS NFR BLD AUTO: 0.7 % (ref 0–1)
BNP BLD-MCNC: 185.8 PG/ML (ref 0–100)
BUN SERPL-MCNC: 19 MG/DL (ref 7–26)
BUN SERPL-MCNC: 19 MG/DL (ref 7–26)
BUN SERPL-MCNC: 20 MG/DL (ref 7–26)
BUN/CREAT SERPL: 23 (ref 6–25)
BUN/CREAT SERPL: 24 (ref 6–25)
BUN/CREAT SERPL: 25 (ref 6–25)
BURR CELLS BLD QL SMEAR: (no result)
BURR CELLS BLD QL SMEAR: (no result)
CALCIUM SERPL-MCNC: 6.2 MG/DL (ref 8.4–10.2)
CALCIUM SERPL-MCNC: 7.1 MG/DL (ref 8.4–10.2)
CALCIUM SERPL-MCNC: 7.1 MG/DL (ref 8.4–10.2)
CHLORIDE SERPL-SCNC: 114 MMOL/L (ref 98–107)
CHLORIDE SERPL-SCNC: 116 MMOL/L (ref 98–107)
CHLORIDE SERPL-SCNC: 116 MMOL/L (ref 98–107)
CK MB SERPL-MCNC: 0.6 NG/ML (ref 0–5)
CK SERPL-CCNC: 15 IU/L (ref 30–200)
CO2 SERPL-SCNC: 16 MMOL/L (ref 22–29)
CO2 SERPL-SCNC: 17 MMOL/L (ref 22–29)
CO2 SERPL-SCNC: 18 MMOL/L (ref 22–29)
DACRYOCYTES BLD QL SMEAR: (no result)
DACRYOCYTES BLD QL SMEAR: (no result)
DEPRECATED APTT PLAS QN: 56 SECONDS (ref 23.8–35.5)
DEPRECATED INR PLAS: 1.55
DEPRECATED NEUTROPHILS # BLD AUTO: 0.5 10*3/UL (ref 2.1–6.9)
DEPRECATED NEUTROPHILS # BLD AUTO: 2.5 10*3/UL (ref 2.1–6.9)
DEPRECATED PHOSPHATE SERPL-MCNC: 2.5 MG/DL (ref 2.3–4.7)
EGFRCR SERPLBLD CKD-EPI 2021: > 60 ML/MIN (ref 60–?)
EOSINOPHIL # BLD AUTO: 0 10*3/UL (ref 0–0.4)
EOSINOPHIL # BLD AUTO: 0 10*3/UL (ref 0–0.4)
EOSINOPHIL NFR BLD AUTO: 0 % (ref 0–6)
EOSINOPHIL NFR BLD AUTO: 0 % (ref 0–6)
EOSINOPHIL NFR BLD MANUAL: 1 % (ref 0–7)
ERYTHROCYTE [DISTWIDTH] IN CORD BLOOD: 17.2 % (ref 11.7–14.4)
ERYTHROCYTE [DISTWIDTH] IN CORD BLOOD: 17.3 % (ref 11.7–14.4)
GLOBULIN PLAS-MCNC: 2.4 G/DL (ref 2.3–3.5)
GLOBULIN PLAS-MCNC: 2.9 G/DL (ref 2.3–3.5)
GLUCOSE SERPLBLD-MCNC: 122 MG/DL (ref 74–118)
GLUCOSE SERPLBLD-MCNC: 93 MG/DL (ref 74–118)
GLUCOSE SERPLBLD-MCNC: 96 MG/DL (ref 74–118)
HCO3 BLDA-SCNC: 14 MMOL/L (ref 22–26)
HCO3 BLDA-SCNC: 16 MMOL/L (ref 22–26)
HCT VFR BLD AUTO: 30.3 % (ref 38.2–49.6)
HCT VFR BLD AUTO: 31.5 % (ref 38.2–49.6)
HGB BLD-MCNC: 9.3 G/DL (ref 14–18)
HGB BLD-MCNC: 9.7 G/DL (ref 14–18)
LIPASE SERPL-CCNC: < 4 U/L (ref 8–78)
LYMPHOCYTES # BLD: 0.2 10*3/UL (ref 1–3.2)
LYMPHOCYTES # BLD: 0.3 10*3/UL (ref 1–3.2)
LYMPHOCYTES NFR BLD AUTO: 41 % (ref 18–39.1)
LYMPHOCYTES NFR BLD AUTO: 7 % (ref 18–39.1)
LYMPHOCYTES NFR BLD MANUAL: 35 % (ref 19–48)
LYMPHOCYTES NFR BLD MANUAL: 4 % (ref 19–48)
MAGNESIUM SERPL-MCNC: 1.7 MG/DL (ref 1.3–2.1)
MCH RBC QN AUTO: 28.1 PG (ref 28–32)
MCH RBC QN AUTO: 28.3 PG (ref 28–32)
MCHC RBC AUTO-ENTMCNC: 30.7 G/DL (ref 31–35)
MCHC RBC AUTO-ENTMCNC: 30.8 G/DL (ref 31–35)
MCV RBC AUTO: 91.3 FL (ref 81–99)
MCV RBC AUTO: 92.1 FL (ref 81–99)
METAMYELOCYTES NFR BLD MANUAL: 1 % (ref 0–0)
METAMYELOCYTES NFR BLD MANUAL: 2 % (ref 0–0)
MONOCYTES # BLD AUTO: 0 10*3/UL (ref 0.2–0.8)
MONOCYTES # BLD AUTO: 0.1 10*3/UL (ref 0.2–0.8)
MONOCYTES NFR BLD AUTO: 1.2 % (ref 4.4–11.3)
MONOCYTES NFR BLD AUTO: 1.8 % (ref 4.4–11.3)
MONOCYTES NFR BLD MANUAL: 1 % (ref 3.4–9)
MONOCYTES NFR BLD MANUAL: 7 % (ref 3.4–9)
MYELOCYTES NFR BLD MANUAL: 3 % (ref 0–0)
MYELOCYTES NFR BLD MANUAL: 7 % (ref 0–0)
NEUTS BAND NFR BLD MANUAL: 12 %
NEUTS BAND NFR BLD MANUAL: 19 %
NEUTS SEG NFR BLD AUTO: 56.6 % (ref 38.7–80)
NEUTS SEG NFR BLD AUTO: 90.1 % (ref 38.7–80)
NEUTS SEG NFR BLD MANUAL: 37 % (ref 40–74)
NEUTS SEG NFR BLD MANUAL: 71 % (ref 40–74)
NRBC/RBC NFR BLD MANUAL: 5 %
PCO2 BLDA: 39 MMHG (ref 35–45)
PCO2 BLDA: 48 MMHG (ref 35–45)
PCO2 BLDA: 63 MMHG (ref 80–105)
PCO2 BLDA: 74 MMHG (ref 80–105)
PH BLDA: 7.13 [PH] (ref 7.35–7.45)
PH BLDA: 7.15 [PH] (ref 7.35–7.45)
PLAT MORPH BLD: (no result)
PLAT MORPH BLD: NORMAL
PLATELET # BLD AUTO: 106 X10E3/UL (ref 140–360)
PLATELET # BLD AUTO: 48 X10E3/UL (ref 140–360)
PLATELET # BLD EST: (no result) 10*3/UL
PLATELET # BLD EST: (no result) 10*3/UL
POLYCHROMASIA BLD QL SMEAR: (no result)
POTASSIUM SERPL-SCNC: 2.8 MMOL/L (ref 3.5–5.1)
POTASSIUM SERPL-SCNC: 2.8 MMOL/L (ref 3.5–5.1)
POTASSIUM SERPL-SCNC: 3.1 MMOL/L (ref 3.5–5.1)
PROTHROMBIN TIME: 19.6 SECONDS (ref 11.9–14.5)
RBC # BLD AUTO: 3.29 X10E6/UL (ref 4.3–5.7)
RBC # BLD AUTO: 3.45 X10E6/UL (ref 4.3–5.7)
RBC MORPH BLD: (no result)
RBC MORPH BLD: (no result)
SAO2 % BLDA: 85 % (ref 95–98)
SAO2 % BLDA: 89 % (ref 95–98)
SCHISTOCYTES BLD QL SMEAR: (no result)
SODIUM SERPL-SCNC: 141 MMOL/L (ref 136–145)
SODIUM SERPL-SCNC: 142 MMOL/L (ref 136–145)
SODIUM SERPL-SCNC: 142 MMOL/L (ref 136–145)

## 2020-07-19 PROCEDURE — 0BH17EZ INSERTION OF ENDOTRACHEAL AIRWAY INTO TRACHEA, VIA NATURAL OR ARTIFICIAL OPENING: ICD-10-PCS | Performed by: INTERNAL MEDICINE

## 2020-07-19 PROCEDURE — 3E043XZ INTRODUCTION OF VASOPRESSOR INTO CENTRAL VEIN, PERCUTANEOUS APPROACH: ICD-10-PCS | Performed by: INTERNAL MEDICINE

## 2020-07-19 PROCEDURE — 02HV33Z INSERTION OF INFUSION DEVICE INTO SUPERIOR VENA CAVA, PERCUTANEOUS APPROACH: ICD-10-PCS | Performed by: INTERNAL MEDICINE

## 2020-07-19 PROCEDURE — B548ZZA ULTRASONOGRAPHY OF SUPERIOR VENA CAVA, GUIDANCE: ICD-10-PCS | Performed by: INTERNAL MEDICINE

## 2020-07-19 PROCEDURE — 5A1935Z RESPIRATORY VENTILATION, LESS THAN 24 CONSECUTIVE HOURS: ICD-10-PCS | Performed by: INTERNAL MEDICINE

## 2020-07-19 RX ADMIN — INSULIN LISPRO SCH UNIT: 100 INJECTION, SOLUTION INTRAVENOUS; SUBCUTANEOUS at 07:30

## 2020-07-19 RX ADMIN — INSULIN LISPRO SCH UNIT: 100 INJECTION, SOLUTION INTRAVENOUS; SUBCUTANEOUS at 11:30

## 2020-07-19 RX ADMIN — SODIUM CHLORIDE SCH MG: 900 INJECTION INTRAVENOUS at 09:00

## 2020-07-19 RX ADMIN — BENZONATATE SCH MG: 100 CAPSULE ORAL at 09:00

## 2020-07-19 NOTE — CONSULTATION
DATE OF CONSULTATION:  

 

Pulmonary Critical Care Consultation 

 

CHIEF COMPLAINT:  Hypotension, metabolic acidosis, and probable sepsis.

 

HISTORY OF PRESENT ILLNESS:  The patient is an 80-year-old man.  He has a history of

metastatic gastric cancer.  He came in on the 10th of July with GI bleeding and anemia.

He required blood transfusion.  Upper endoscopy and biopsy showed invasive gastric

carcinoma.  He subsequently required a laparotomy and hemigastrectomy.  He was doing

well and he was actually scheduled for discharge. 

 

This evening, he became more disoriented.  He had a low blood pressure.  He had

difficulty breathing and a rapid response was called.  He required intubation.  He also

had a blood gas, that showed a profound metabolic acidosis and a CBC that showed a white

count of 0.83.  He received 3 L of IV fluids as well as initial antibiotics.  CT scan of

the chest showed a right lower lobe pneumonia as well as a small pulmonary embolism.

There was free air in the abdomen from prior surgeries. 

 

PAST SURGICAL HISTORY:  Status post recent hemigastrectomy.

 

PAST MEDICAL HISTORY:  

1. Gastric cancer.

2. No known cardiac history.

 

SOCIAL HISTORY:  Noncontributory.

 

FAMILY HISTORY:  Noncontributory.

 

REVIEW OF SYSTEMS:

The patient had decreased responsiveness.  He had difficulty breathing.  There was no

reported chest pain.  He had no nausea or vomiting.  There was no reported melena or

hematochezia. 

 

PHYSICAL EXAMINATION:

VITAL SIGNS:  The patient is now on Levophed and his blood pressure is 110/70, his heart

rate is 120 to 130 and he is in atrial fibrillation.  He has an oral endotracheal tube

in place.  He is on a PRVC mode of ventilation.  He has a right IJ line in place.  The

site looks clean. 

CARDIAC:  Reveals an irregularly irregular rhythm with normal S1 and S2. 

LUNGS:  Auscultation of lungs shows decreased breath sounds at the bases.  There is no

wheezing. 

ABDOMEN:  Soft and nontender.  There are staples from recent laparotomy. 

EXTREMITIES:  Show no leg edema or calf tenderness.  There is no cyanosis or clubbing.

IMPRESSION:  

1. Aspiration pneumonia with septic shock.

2. Right lower lobe pulmonary embolism.

3. Metastatic gastric cancer.

4. Hypokalemia.

5. Neutropenia.

 

PLAN:  

1. The patient has started on broad-spectrum antibiotics after receiving intravenous

fluid bolus. 

2. Begin bicarbonate drip.

3. Replace potassium.

4. Monitor blood counts.

5. General Surgery to evaluate the patient for free air in the abdomen.

6. Heparin for PE.

7. Prognosis is poor.

8. Case discussed with Dr. Hsu of the emergency department, Dr. Dumont,

nursing staff, and Respiratory. 

 

 

 

 

______________________________

MD SUPA Baker/MODL

D:  07/19/2020 04:14:59

T:  07/19/2020 06:51:55

Job #:  [id number]

## 2020-07-19 NOTE — DIAGNOSTIC IMAGING REPORT
EXAMINATION: CT of the abdomen and pelvis with contrast.



TECHNIQUE: 

Spiral CT images of the abdomen and pelvis were performed from the lung bases

to the lesser trochanters after the intravenous administration of 100 cc of

Isovue 370 and the oral administration of dilute Gastrografin.  Coronal and

sagittal reformatted images were obtained.



COMPARISON: Portable chest 7/18/2020, CT abdomen and pelvis 6/26/2020



CLINICAL HISTORY:Free air under the hemidiaphragm, abdominal surgery 5 days ago

with near total gastrectomy

     

DISCUSSION:



ABDOMEN/PELVIS:



LOWER THORAX:Please see CT chest performed same date for further detail.



HEPATOBILIARY: No focal hepatic lesions.  No intra or extrahepatic biliary

ductal dilation.



GALLBLADDER: No radio-opaque stones or sludge.  No wall thickening.



SPLEEN: No splenomegaly.



PANCREAS: No focal masses or ductal dilatation. 



ADRENALS: No nodules. Thickening of bilateral adrenal glands, left greater than

right.



KIDNEYS/URETERS: Symmetrical renal enhancement. No renal or ureteral calculi.

Mild bilateral pelvocaliectasis. Stable 2.7 cm fluid density simple cyst in the

left superior pole (series 13, image 24).

No solid enhancing masses.



PELVIC ORGANS/BLADDER: Moderate circumferential bladder wall thickening, which

is irregular on its left lateral and posterior aspect. Multiple air foci are

noted in the bladder wall, with air-fluid level in the anterior/nondependent

portion of the bladder lumen

Moderate prostatic enlargement.



 PERITONEUM/RETROPERITONEUM: Large pneumoperitoneum. Moderate free fluid in the

posterior pelvis, which measures near fluid density. No well-defined enhancing

fluid collections.



LYMPH NODES: No intra-abdominal, retroperitoneal, pelvic or inguinal

lymphadenopathy.



VESSELS: The celiac trunk,superior and inferior mesenteric and bilateral  renal

arteries are patent  The portal, superior mesenteric and splenic veins are

patent. Atherosclerotic calcification of the abdominal aorta and iliac vessels.



GI TRACT: Postsurgical changes in the region of the stomach.

Multiple extraluminal foci of air are noted adjacent to linear hyperdensities

suspected to be surgical sutures near the GE junction (for example series 13,

images 22, 14), and anterior to the head of the pancreas (series 13).

 Multiple mildly dilated loops of small bowel are noted in the anterior

peritoneal cavity, with maximal measurement of 3.1 cm (for example series 13,

image 27) involving predominantly the jejunum and proximal ileum.

Distal ileal loops are normal in caliber.

No definite transition point is noted, and there is progressive transition to

normal caliber in the mid to distal ileum.

Mild circumferential wall thickening throughout the large bowel, however, the

bowel is decompressed.

No evidence of oral contrast extravasation.



BONES AND SOFT TISSUE: No aggressive lytic or suspicious focal sclerotic

lesions. Multilevel degenerated discs in the lower thoracic and lumbosacral

spine, worse at L2-L3 L3-L4 and L5-S1. Facet hypertrophy L5-S1. Generalized

osteopenia.  Generalized anasarca.



IMPRESSION: 

1. Large pneumoperitoneum, greater than expected for 5 days postoperative

state. Multiple extraluminal foci of air adjacent to surgical sutures near the

GE junction are suspicious for anastomotic leakage or wound dehiscence. No oral

contrast extravasation is noted.



2. Moderate circumferential bladder wall thickening, which is irregular in the

left lateral and posterior aspect, unchanged since prior exam, however,

multiple air foci are noted in the bladder wall, highly suspicious for

emphysematous cystitis. An intraperitoneal bladder rupture is a less likely

consideration for the pneumoperitoneum.

3. Multiple mildly dilated loops of small bowel without transition point likely

reflect ileus.

4. Thickening of bilateral adrenal glands, likely representing hyperplasia. No

focal lesions.



Signed by: Dr. Michelet Romero M.D. on 7/19/2020 3:21 AM

## 2020-07-19 NOTE — NUR
Received Mr. Martinez in bed, vent dependent via ETT. Vent set to PRVC mode Fio2 100%/P-10/R 
25/. oxygen saturation mid to high 90's. Pt is  hemodynamically very unstable. 
Levophed was at maximum and Vasopressin was also added at the maximum rate and also the 
patient was given a one liter bolus. Labs were sent with multiple critical values resulting. 
Dr. Vega and Dr. Mendoza were made aware. Due to the poor prognosis, given his oncological 
situation and overall medical condition, the family was consulted and they opted to withdraw 
care given the medical futility of ongoing treatment. All family members that requested to 
say a personal farewell to the patient were allowed to do so, proper PPE's and instructions 
on donning and doffing the PPE's was provided by staff. All concerns were addressed by staff 
to the family's satisfaction as per the members present. The patient was medicated with 
Hydromorphone and lorazepam as ordered. Then life support measures were withdrawn. The 
patient's family were observing from a safe distance. At 1335 all vital ceased and the 
patient was pronounced by Dr. LUIS E Mendoza. Post mortem care was provided and the patient 
remains will be picked up by the  home of the family's choosing.  All personal 
belongings will be sent with the patient.

## 2020-07-19 NOTE — DIAGNOSTIC IMAGING REPORT
Examination: CT head without contrast

Clinical Indication: Unresponsive.

Technique: Transaxial noncontrast images from the skull base through the vertex

were obtained. Sagittal and coronal reformatted images were done.

Dose modulation, iterative reconstruction, and/or weight based adjustment of

the mA/kV was utilized to reduce the radiation dose to as low as reasonably

achievable. 

Comparison: None.



Findings:

There is significant motion artifact on the scan.



Scalp: No abnormalities.

Bones: Intact. No fractures.  No blastic or lytic lesions. 



Brain sulci: Appropriate for patient's age.

Ventricles: Normal in size and configuration.   No hydrocephalus.  



Extra-axial space:

No large hemorrhage..



Parenchyma: 

No large masses, large hemorrhage, or large acute or chronic cortical based

vascular insults.

There are patchy and confluent  areas of hypoattenuation in the periventricular

and subcortical white matter, nonspecific.

 

Suprasellar region: No abnormalities.

Craniocervical junction: The foramen magnum is patent.  No Chiari one

malformation.



Impression:



Despite limitation, no large intraparenchymal or extra-axial hemorrhage or

large acute territorial infarct.



Chronic microvascular ischemic change. 



Signed by: Dr. Eneida Kapadia M.D. on 7/19/2020 3:06 AM

## 2020-07-19 NOTE — DIAGNOSTIC IMAGING REPORT
EXAMINATION: CT of the chest with contrast, PE protocol.



TECHNIQUE:  Spiral CT images of the chest were performed from the lung apices

through the level of the adrenal glands after the IV administration of 100 cc

of Isovue 370.  Thin section reconstructions were obtained with special

concentration on the pulmonary arteries. Coronal and sagittal reformatted

images were performed



COMPARISON: CT chest 7/10/2020, portable chest performed same date



CLINICAL HISTORY:Pneumonia, postintubation, suspect PE

     

DISCUSSION:



LINES AND TUBES: 

Endotracheal tube in place, with distal tip in the upper trachea at the level

of the thoracic inlet approximately 7 cm above the melinda



LUNGS:  

Filling defect noted in segmental branch to the anterior basal segment of the

right lower lobe (series 9, images 73-76 and coronal images 45 and 46). No

other filling defects are identified in the main, or left pulmonary artery to

the segmental level.

Bilateral upper and lower lobe predominantly peripheral, patchy focal

groundglass opacities with visible intralobular lines.

Some of these opacities have more central confluent areas of consolidation (for

example series 10, image 27 in the upper lobes bilaterally).

Compressive atelectasis of the right upper lobe.

Consolidation with air bronchograms involving the greater portion of the right

lower lobe (series 10, image 76).

Mild compressive atelectasis of the left lower lobe.

A few tree-in-bud opacities with associated bronchiectasis are noted in the

medial aspect of the right middle lobe and lingula (for example series 10,

image 91), stable since prior CT



AIRWAYS:  

Major airways are clear, without endobronchial lesions.



PLEURA:  

Small right pleural effusion which is partially loculated in the right upper

lung. Small left pleural effusion.



HEART AND MEDIASTINUM: 

 Thyroid is unremarkable. Heart size is normal. No pericardial effusion.

Atherosclerotic calcification of the coronary arteries and thoracic aortic

arch. Aorta is not aneurysmal. Main pulmonary artery is normal in caliber.



LYMPH NODES: 

No mediastinal, hilar or axillary adenopathy.



ABDOMEN:  

Please see CT abdomen performed same day for further detail



BONES AND SOFT TISSUES: 

 No aggressive lytic lesions or suspicious focal sclerotic lesions. Degenerated

discs in the thoracic spine. 



IMPRESSION: 

1. Pulmonary embolism involving right lower lobe anterior basal segment branch.



2. Commonly reported imaging features of COVID 19 pneumonia are present. Other

processes such as influenza pneumonia and organizing pneumonia, as can be seen

with drug toxicity and connective tissue disease, can cause a similar imaging

pattern.



3. Right lower lobe consolidation, which can be secondary to COVID 19

pneumonia, however, bacterial pneumonia is also a consideration.



4. Small bilateral pleural effusions. The right pleural effusion is partly

loculated in the right upper lung.



5. Endotracheal tube has distal tip at the thoracic inlet approximately 7 cm

above the melinda.



6. Stable changes in the medial right middle lobe and lingula consistent with

BONITA



7.  Findings discussed with Dr. Hsu 7/19/2020 at 0252 hours









Signed by: Dr. Michelet Romero M.D. on 7/19/2020 2:56 AM

## 2020-07-19 NOTE — DIAGNOSTIC IMAGING REPORT
Examination: Single AP view of the chest.



COMPARISON: Portable chest 7/18/2020, CT chest 7/19/2020



INDICATION: None.

    

IMPRESSION:

 

1.  Lines and Tubes: Interval placement of right-sided central line with distal

tip projecting at the cavoatrial junction.

2. Otherwise no significant interval change since the prior exams.



Signed by: Dr. Michelet Romero M.D. on 7/19/2020 6:36 AM

## 2020-07-19 NOTE — OPERATIVE REPORT
DATE OF PROCEDURE:  

 

SURGEON:  Jae Mendoza MD

 

PROCEDURE:  Central line placement under ultrasound guidance.

 

PREOPERATIVE DIAGNOSES:  Gastric cancer, metabolic acidosis, probable sepsis.

 

POSTOPERATIVE DIAGNOSES:  Gastric cancer, metabolic acidosis, probable sepsis.

 

CONSENT:  Procedure was deemed emergent due to hemodynamic instability.  The patient was

on Levophed. 

 

ANESTHESIA:  1% lidocaine for local anesthesia.

 

PROCEDURE IN DETAIL:  The right neck was prepped sterilely with chlorhexidine.  A

full-length sterile drape, sterile gown, sterile gloves, and mask were used.  An

ultrasound machine was used to locate the right internal jugular vein.  1% lidocaine was

used for local anesthesia.  The right internal jugular vein was cannulated under direct

visualization with a 16-gauge needle.  A wire was passed through the needle.  The skin

was dilated with a dilator.  Triple-lumen catheter was placed over the wire by the

Seldinger technique.  All the ports were flushed. 

 

COMPLICATIONS:  None.

 

ESTIMATED BLOOD LOSS:  None.

 

 

 

 

______________________________

Jae Mendoza MD

 

[facility]

D:  07/19/2020 04:10:09

T:  07/19/2020 06:46:30

Job #:  [id number]

## 2020-07-19 NOTE — NUR
GAVE REPORT TO NURSE IN ICU. HAVE NOT SEEN THE PATIENT SINCE HE LEFT FOR CT OF 
CHEST/ABDOMEN/PELVIS. GAVE ALL PATIENT'S BELONGINGS TO THE ICU NURSE.

## 2020-07-19 NOTE — PROGRESS NOTE
DATE:  

 

Pulmonary Critical Care Progress Note 

 

SUBJECTIVE:  The patient required intubation last night.  He was started on pressors. 

 

X-ray subsequently showed right lower lobe pneumonia with some metastatic disease.

 

PHYSICAL EXAMINATION:

VITAL SIGNS:  The patient is now on pressors.  His heart rate is 108 and his blood

pressure is 174/44 on maximum Levophed and vasopressin.  He has an oral endotracheal

tube.  There is a right IJ line.  The site looks clean. 

CARDIAC:  Reveals tachycardia with normal S1 and S2. 

LUNGS:  Auscultation of lungs reveals decreased breath sounds at the bases.  There is no

wheezing. 

ABDOMEN:  Soft and nontender.  There is no rebound or guarding. 

EXTREMITIES:  Shows no leg edema or calf tenderness. 

NEUROLOGICAL:  Shows no focal abnormalities.

LABORATORY DATA:  White blood cell count is 2.7 and hemoglobin is 8.7.  The platelet

count is 48.  BUN to creatinine ratio is 20 to 0.88.  The potassium is 3.1 and the

carbon dioxide is 18.  Chloride is 116.  Lactic acid is 5.2 and the albumin is 0.9. 

 

IMPRESSION:  

1. Septic shock secondary to pneumonia.

2. Metastatic gastric cancer.

3. Pulmonary embolism.

4. Leukopenia.

5. Thrombocytopenia.

 

PLAN:  

1. Prognosis is extremely poor.

2. I discussed case with Dr. Tj Dumont, Dr. Umberto Mccormick, and the son.  The family

would like to withdraw support from the ventilator and the pressors.  The purpose being

not to prolong his suffering. 

3. We will make arrangements for terminal oxidation along with palliative care.

 

 

 

 

______________________________

Jae Mendoza MD

 

[facility]

D:  07/19/2020 12:35:26

T:  07/19/2020 13:02:31

Job #:  [id number]